# Patient Record
Sex: MALE | Race: WHITE | Employment: FULL TIME | URBAN - METROPOLITAN AREA
[De-identification: names, ages, dates, MRNs, and addresses within clinical notes are randomized per-mention and may not be internally consistent; named-entity substitution may affect disease eponyms.]

---

## 2018-10-12 RX ORDER — ATORVASTATIN CALCIUM 10 MG/1
TABLET, FILM COATED ORAL
Qty: 90 TAB | Refills: 1 | Status: SHIPPED | OUTPATIENT
Start: 2018-10-12 | End: 2019-03-31 | Stop reason: SDUPTHER

## 2019-01-24 LAB — CREATININE, EXTERNAL: 0.99

## 2019-05-09 RX ORDER — ATORVASTATIN CALCIUM 10 MG/1
TABLET, FILM COATED ORAL
Qty: 30 TAB | Refills: 0 | Status: SHIPPED | OUTPATIENT
Start: 2019-05-09 | End: 2019-12-13 | Stop reason: SDUPTHER

## 2019-08-16 ENCOUNTER — OFFICE VISIT (OUTPATIENT)
Dept: FAMILY MEDICINE CLINIC | Age: 59
End: 2019-08-16

## 2019-08-16 VITALS
TEMPERATURE: 98.4 F | SYSTOLIC BLOOD PRESSURE: 133 MMHG | WEIGHT: 214 LBS | HEIGHT: 74 IN | RESPIRATION RATE: 16 BRPM | BODY MASS INDEX: 27.46 KG/M2 | OXYGEN SATURATION: 97 % | DIASTOLIC BLOOD PRESSURE: 89 MMHG | HEART RATE: 63 BPM

## 2019-08-16 DIAGNOSIS — Z12.5 SCREENING FOR PROSTATE CANCER: ICD-10-CM

## 2019-08-16 DIAGNOSIS — Z11.59 ENCOUNTER FOR HEPATITIS C SCREENING TEST FOR LOW RISK PATIENT: ICD-10-CM

## 2019-08-16 DIAGNOSIS — E78.00 HYPERCHOLESTEREMIA: ICD-10-CM

## 2019-08-16 DIAGNOSIS — Z00.00 ANNUAL PHYSICAL EXAM: ICD-10-CM

## 2019-08-16 DIAGNOSIS — E74.39 GLUCOSE INTOLERANCE: ICD-10-CM

## 2019-08-16 DIAGNOSIS — I10 ESSENTIAL HYPERTENSION: ICD-10-CM

## 2019-08-16 RX ORDER — RAMIPRIL 2.5 MG/1
2.5 CAPSULE ORAL DAILY
Qty: 90 CAP | Refills: 1 | Status: SHIPPED | OUTPATIENT
Start: 2019-08-16 | End: 2020-03-12

## 2019-08-16 RX ORDER — ATORVASTATIN CALCIUM 10 MG/1
TABLET, FILM COATED ORAL
COMMUNITY
Start: 2019-05-28 | End: 2019-08-16 | Stop reason: SDUPTHER

## 2019-08-16 RX ORDER — METOPROLOL TARTRATE 50 MG/1
100 TABLET ORAL DAILY
Qty: 180 TAB | Refills: 1 | Status: SHIPPED | OUTPATIENT
Start: 2019-08-16 | End: 2020-03-12

## 2019-08-16 RX ORDER — RAMIPRIL 2.5 MG/1
CAPSULE ORAL
COMMUNITY
Start: 2019-07-30 | End: 2019-08-16 | Stop reason: SDUPTHER

## 2019-08-16 RX ORDER — GUAIFENESIN 100 MG/5ML
81 LIQUID (ML) ORAL DAILY
COMMUNITY

## 2019-08-16 RX ORDER — METOPROLOL TARTRATE 50 MG/1
TABLET ORAL
COMMUNITY
Start: 2019-07-30 | End: 2019-08-16 | Stop reason: SDUPTHER

## 2019-08-16 RX ORDER — ATORVASTATIN CALCIUM 10 MG/1
10 TABLET, FILM COATED ORAL DAILY
Qty: 90 TAB | Refills: 1 | Status: SHIPPED | OUTPATIENT
Start: 2019-08-16 | End: 2019-12-12 | Stop reason: SDUPTHER

## 2019-08-16 NOTE — PATIENT INSTRUCTIONS
INGUINAL HERNIA BOTH SIDES         DASH Diet: Care Instructions  Your Care Instructions    The DASH diet is an eating plan that can help lower your blood pressure. DASH stands for Dietary Approaches to Stop Hypertension. Hypertension is high blood pressure. The DASH diet focuses on eating foods that are high in calcium, potassium, and magnesium. These nutrients can lower blood pressure. The foods that are highest in these nutrients are fruits, vegetables, low-fat dairy products, nuts, seeds, and legumes. But taking calcium, potassium, and magnesium supplements instead of eating foods that are high in those nutrients does not have the same effect. The DASH diet also includes whole grains, fish, and poultry. The DASH diet is one of several lifestyle changes your doctor may recommend to lower your high blood pressure. Your doctor may also want you to decrease the amount of sodium in your diet. Lowering sodium while following the DASH diet can lower blood pressure even further than just the DASH diet alone. Follow-up care is a key part of your treatment and safety. Be sure to make and go to all appointments, and call your doctor if you are having problems. It's also a good idea to know your test results and keep a list of the medicines you take. How can you care for yourself at home? Following the DASH diet  · Eat 4 to 5 servings of fruit each day. A serving is 1 medium-sized piece of fruit, ½ cup chopped or canned fruit, 1/4 cup dried fruit, or 4 ounces (½ cup) of fruit juice. Choose fruit more often than fruit juice. · Eat 4 to 5 servings of vegetables each day. A serving is 1 cup of lettuce or raw leafy vegetables, ½ cup of chopped or cooked vegetables, or 4 ounces (½ cup) of vegetable juice. Choose vegetables more often than vegetable juice. · Get 2 to 3 servings of low-fat and fat-free dairy each day. A serving is 8 ounces of milk, 1 cup of yogurt, or 1 ½ ounces of cheese.   · Eat 6 to 8 servings of grains each day. A serving is 1 slice of bread, 1 ounce of dry cereal, or ½ cup of cooked rice, pasta, or cooked cereal. Try to choose whole-grain products as much as possible. · Limit lean meat, poultry, and fish to 2 servings each day. A serving is 3 ounces, about the size of a deck of cards. · Eat 4 to 5 servings of nuts, seeds, and legumes (cooked dried beans, lentils, and split peas) each week. A serving is 1/3 cup of nuts, 2 tablespoons of seeds, or ½ cup of cooked beans or peas. · Limit fats and oils to 2 to 3 servings each day. A serving is 1 teaspoon of vegetable oil or 2 tablespoons of salad dressing. · Limit sweets and added sugars to 5 servings or less a week. A serving is 1 tablespoon jelly or jam, ½ cup sorbet, or 1 cup of lemonade. · Eat less than 2,300 milligrams (mg) of sodium a day. If you limit your sodium to 1,500 mg a day, you can lower your blood pressure even more. Tips for success  · Start small. Do not try to make dramatic changes to your diet all at once. You might feel that you are missing out on your favorite foods and then be more likely to not follow the plan. Make small changes, and stick with them. Once those changes become habit, add a few more changes. · Try some of the following:  ? Make it a goal to eat a fruit or vegetable at every meal and at snacks. This will make it easy to get the recommended amount of fruits and vegetables each day. ? Try yogurt topped with fruit and nuts for a snack or healthy dessert. ? Add lettuce, tomato, cucumber, and onion to sandwiches. ? Combine a ready-made pizza crust with low-fat mozzarella cheese and lots of vegetable toppings. Try using tomatoes, squash, spinach, broccoli, carrots, cauliflower, and onions. ? Have a variety of cut-up vegetables with a low-fat dip as an appetizer instead of chips and dip. ? Sprinkle sunflower seeds or chopped almonds over salads. Or try adding chopped walnuts or almonds to cooked vegetables.   ? Try some vegetarian meals using beans and peas. Add garbanzo or kidney beans to salads. Make burritos and tacos with mashed zamora beans or black beans. Where can you learn more? Go to http://ramiro-vishal.info/. Enter C509 in the search box to learn more about \"DASH Diet: Care Instructions. \"  Current as of: July 22, 2018  Content Version: 12.1  © 0212-6870 [employer]. Care instructions adapted under license by [employer] (which disclaims liability or warranty for this information). If you have questions about a medical condition or this instruction, always ask your healthcare professional. [place] [address] any warranty or liability for your use of this information. The goal blood pressure for most patients is 140/90. The whole point of treating blood pressure is to prevent strokes, heart attacks and kidney damage. Persistently elevated blood pressure damages blood vessels and can lead to catastrophic heart problems and strokes. Recommendations for Hypertension (elevated blood pressure):    · Purchase a blood pressure cuff that goes around your upper arm and check blood pressure at least 3 times per week. Write down your numbers for review. Check blood pressure in the morning and evening. Rest for 5 minutes with feet elevated and arm resting on a table (at mid-chest level) when checking blood pressure    · Exercise 30-60 minutes most days of the week, preferably with a mix of cardiovascular and strength training. Exercise can improve blood pressure, even if you do not lose weight! · Limit alcohol intake to less than 3-4 drinks per week. · Avoid tobacco products    · Avoid illegal drugs especially amphetamines  · DASH diet - includes fruits, vegetables, fiber, and less than 2000 mg sodium (salt) daily. · Try to eat a low sugar diet. Sugar worsens diabetes and activates kidney hormones that raise blood pressure.    · Avoid non-steroidal inflammatory medications (NSAIDS) such as ibuprofen, advil, motrin, aleve, and naproxyn as these may increase blood pressure if used long-term    · Avoid OTC decongestants such as pseudopherine, phenylephrine, Afrin  · Take your blood pressure medicine (and aspirin if prescribed) religiously        Skin cancer prevention    It's possible to prevent skin cancer. Current recommendations include avoiding sun in the peak hours of the day, wearing hats and long sleeves in the sun and using sun blocks regularly. Patient with sun damaged skin or previously skin cancer should get yearly skin exams. Also, there is very good evidence that Vitamin B3 in the form of Nicotinamide 500mg twice a day prevents non melanoma skin cancers and lowers the rate of those cancers 20-30% over time. Nicotinamide (which is the same as Niacinamide) is available on line and is relatively inexpensive. It is also found in many B complex vitamins. No prescription is necessary for Nicotinamide.

## 2019-08-16 NOTE — PROGRESS NOTES
Assessment and Plan:    1. Annual physical exam  Diet exercise and safety discussed. Bilateral unrepaired inguinal hernias added to problem list and discussed    2. Essential hypertension  At goal, same meds  - metoprolol tartrate (LOPRESSOR) 50 mg tablet; Take 2 Tabs by mouth daily. Dispense: 180 Tab; Refill: 1  - ramipril (ALTACE) 2.5 mg capsule; Take 1 Cap by mouth daily. Dispense: 90 Cap; Refill: 1  - METABOLIC PANEL, BASIC  - MICROALBUMIN, UR, RAND W/ MICROALB/CREAT RATIO    3. Hypercholesteremia  On statin, to continue  - atorvastatin (LIPITOR) 10 mg tablet; Take 1 Tab by mouth daily. Dispense: 90 Tab; Refill: 1  - HEPATIC FUNCTION PANEL  - LIPID PANEL  - TSH 3RD GENERATION    4. Screening for prostate cancer  Normal JOSE  - PSA, DIAGNOSTIC (PROSTATE SPECIFIC AG)    5. Glucose intolerance  recheck  - CBC WITH AUTOMATED DIFF  - HEMOGLOBIN A1C WITH EAG    6. Encounter for hepatitis C screening test for low risk patient  screening  - HEPATITIS C AB      Diagnoses and plans were discussed with the patient. After visit summary given to the patient as well. Sherine Rangel MD    [facility] Kandis WEBSTER is a 60 y.o. male who had concerns including Complete Physical.    Health maintenance:    Hypertension  Takes meds consistently  At goal    Hypercholesterolemia  On statin    Immunizations needed: Tdap or DT has had   Shingrix needs   Influenza vaccine advised to get    Cancer screening status   Colonoscopy 2013, normal   PSA today   Lung CT nonsmoker    Cardiovascular risk factors:   Smoking nonsmoker   HTN under rx   Cholesterol under rx   Diabetes glucose intolerance   Chronic kidney disease   Family History    Last eye exam yearly  Last dental exam yearly  Minimal ETOH    Meds:    Current Outpatient Medications:     aspirin 81 mg chewable tablet, Take 81 mg by mouth daily. , Disp: , Rfl:     varicella-zoster recombinant, PF, (SHINGRIX) 50 mcg/0.5 mL susr injection, 0.5 mL by IntraMUSCular route once for 1 dose. Repeat second dose in 6 months., Disp: 0.5 mL, Rfl: 1    atorvastatin (LIPITOR) 10 mg tablet, Take 1 Tab by mouth daily. , Disp: 90 Tab, Rfl: 1    metoprolol tartrate (LOPRESSOR) 50 mg tablet, Take 2 Tabs by mouth daily. , Disp: 180 Tab, Rfl: 1    ramipril (ALTACE) 2.5 mg capsule, Take 1 Cap by mouth daily. , Disp: 90 Cap, Rfl: 1   Allergies:  Not on File  Smoker:   Social History     Tobacco Use   Smoking Status Never Smoker   Smokeless Tobacco Never Used     ETOH:   Social History     Substance and Sexual Activity   Alcohol Use Yes       FH:    Family History   Problem Relation Age of Onset    Coronary Artery Disease Father     Hypertension Father     Lung Cancer Father     Heart Attack Father        ROS:  General/Constitutional:  No headache, fever, fatigue, weight loss or weight gain     Eyes:  No redness, pruritis, pain, visual changes, swelling, or discharge    Ears:  No pain, loss or changes in hearin    Neck:  No swelling, masses, stiffness, pain, or limited movemen    Cardiac:   No chest pain    Respiratory:  No cough or shortness of breath    GI:  No nausea/vomiting, diarrhea, abdominal pain, bloody or dark stools     :  No dysuria or  hematuria   Neurological:  No loss of consciousness, dizziness, seizures, dysarthria, cognitive changes, memory changes,  problems with balance, or unilateral weakness    Skin: No rash         Physical Exam:  Visit Vitals  /89 (BP 1 Location: Right arm, BP Patient Position: Sitting)   Pulse 63   Temp 98.4 °F (36.9 °C) (Oral)   Resp 16   Ht 6' 2\" (1.88 m)   Wt 214 lb (97.1 kg)   SpO2 97%   BMI 27.48 kg/m²       Physical Examination: General appearance - alert, well appearing, and in no distress, oriented to person, place, and time and normal appearing weight  Mental status -  normal mood, behavior, speech, dress, motor activity, and thought processes, no expressed suicidal or homicidal ideation, no hallucinations  Ears - bilateral TM's and external ear canals normal  Nose - normal and patent, no erythema, discharge or polyps  Mouth - mucous membranes moist, pharynx normal without lesions  Neck - supple, no significant adenopathy  Chest - normal chest excursion, normal inspiratory and expiratory function. Clear to ausculation bilaterally. Heart - normal rate, regular rhythm, normal S1, S2, no murmurs, rubs, clicks or gallops, no extremity edema  Abdomen- benign, no organomegaly or masses, bilateral inguinal hernias  -normal penis and testicles, 2+ prostate  Skin-no rashes or suspicious moles  Heme negative stool  Neuro normal speech, moves all extremities, normal gait  Musculoskeletal- grossly normal joint and motor function.

## 2019-08-16 NOTE — PROGRESS NOTES
Frankie Mansfield is a 60 y.o. male      Chief Complaint   Patient presents with    Complete Physical         1. Have you been to the ER, urgent care clinic since your last visit? Hospitalized since your last visit? Yes   Partial knee replacement       2. Have you seen or consulted any other health care providers outside of the [address] [place] [address] since your last visit? Include any pap smears or colon screening. no

## 2019-08-17 LAB
ALBUMIN SERPL-MCNC: 4.9 G/DL (ref 3.5–5.5)
ALBUMIN/CREAT UR: 2.5 MG/G CREAT (ref 0–30)
ALP SERPL-CCNC: 87 IU/L (ref 39–117)
ALT SERPL-CCNC: 21 IU/L (ref 0–44)
AST SERPL-CCNC: 20 IU/L (ref 0–40)
BASOPHILS # BLD AUTO: 0 X10E3/UL (ref 0–0.2)
BASOPHILS NFR BLD AUTO: 0 %
BILIRUB DIRECT SERPL-MCNC: 0.19 MG/DL (ref 0–0.4)
BILIRUB SERPL-MCNC: 0.6 MG/DL (ref 0–1.2)
BUN SERPL-MCNC: 15 MG/DL (ref 6–24)
BUN/CREAT SERPL: 14 (ref 9–20)
CALCIUM SERPL-MCNC: 10.4 MG/DL (ref 8.7–10.2)
CHLORIDE SERPL-SCNC: 102 MMOL/L (ref 96–106)
CHOLEST SERPL-MCNC: 185 MG/DL (ref 100–199)
CO2 SERPL-SCNC: 27 MMOL/L (ref 20–29)
CREAT SERPL-MCNC: 1.11 MG/DL (ref 0.76–1.27)
CREAT UR-MCNC: 157.6 MG/DL
EOSINOPHIL # BLD AUTO: 0.3 X10E3/UL (ref 0–0.4)
EOSINOPHIL NFR BLD AUTO: 5 %
ERYTHROCYTE [DISTWIDTH] IN BLOOD BY AUTOMATED COUNT: 14.1 % (ref 12.3–15.4)
EST. AVERAGE GLUCOSE BLD GHB EST-MCNC: 123 MG/DL
GLUCOSE SERPL-MCNC: 87 MG/DL (ref 65–99)
HBA1C MFR BLD: 5.9 % (ref 4.8–5.6)
HCT VFR BLD AUTO: 46.6 % (ref 37.5–51)
HCV AB S/CO SERPL IA: <0.1 S/CO RATIO (ref 0–0.9)
HDLC SERPL-MCNC: 71 MG/DL
HGB BLD-MCNC: 16 G/DL (ref 13–17.7)
IMM GRANULOCYTES # BLD AUTO: 0 X10E3/UL (ref 0–0.1)
IMM GRANULOCYTES NFR BLD AUTO: 0 %
LDLC SERPL CALC-MCNC: 98 MG/DL (ref 0–99)
LYMPHOCYTES # BLD AUTO: 2.3 X10E3/UL (ref 0.7–3.1)
LYMPHOCYTES NFR BLD AUTO: 34 %
MCH RBC QN AUTO: 31 PG (ref 26.6–33)
MCHC RBC AUTO-ENTMCNC: 34.3 G/DL (ref 31.5–35.7)
MCV RBC AUTO: 90 FL (ref 79–97)
MICROALBUMIN UR-MCNC: 3.9 UG/ML
MONOCYTES # BLD AUTO: 0.7 X10E3/UL (ref 0.1–0.9)
MONOCYTES NFR BLD AUTO: 10 %
NEUTROPHILS # BLD AUTO: 3.4 X10E3/UL (ref 1.4–7)
NEUTROPHILS NFR BLD AUTO: 51 %
PLATELET # BLD AUTO: 278 X10E3/UL (ref 150–450)
POTASSIUM SERPL-SCNC: 5 MMOL/L (ref 3.5–5.2)
PROT SERPL-MCNC: 7.4 G/DL (ref 6–8.5)
PSA SERPL-MCNC: 1.1 NG/ML (ref 0–4)
RBC # BLD AUTO: 5.16 X10E6/UL (ref 4.14–5.8)
SODIUM SERPL-SCNC: 143 MMOL/L (ref 134–144)
TRIGL SERPL-MCNC: 78 MG/DL (ref 0–149)
TSH SERPL DL<=0.005 MIU/L-ACNC: 1.12 UIU/ML (ref 0.45–4.5)
VLDLC SERPL CALC-MCNC: 16 MG/DL (ref 5–40)
WBC # BLD AUTO: 6.7 X10E3/UL (ref 3.4–10.8)

## 2019-08-19 NOTE — PROGRESS NOTES
Your blood work looks good. The blood sugar and cholesterol are in good ranges. The only real abnormality is a minimally elevated calcium level. The calcium test is a inconsistent sometimes erroneous test and for this mild abnormality, I would only recommend rechecking the level in 6 months at your next appointment. I don't think it is reflective of any real problem. See me in 6 months.

## 2019-08-20 ENCOUNTER — TELEPHONE (OUTPATIENT)
Dept: FAMILY MEDICINE CLINIC | Age: 59
End: 2019-08-20

## 2019-08-20 NOTE — TELEPHONE ENCOUNTER
Call him. I can probably fill it out unless they have some unusual requirements. Send to us and I'll look it over and decide.   [facility]

## 2019-08-20 NOTE — TELEPHONE ENCOUNTER
Patient was here on Friday. He is starting Scuba lessons as a new hobby and they want his PCP to review a form he filled out before he can start lessons. Patient is faxing it to us, is this ok to fill out as he was just here or would you like him to have another appt?

## 2019-12-12 DIAGNOSIS — E78.00 HYPERCHOLESTEREMIA: ICD-10-CM

## 2019-12-13 RX ORDER — ATORVASTATIN CALCIUM 10 MG/1
TABLET, FILM COATED ORAL
Qty: 90 TAB | Refills: 1 | Status: SHIPPED | OUTPATIENT
Start: 2019-12-13 | End: 2020-06-01

## 2020-03-11 DIAGNOSIS — I10 ESSENTIAL HYPERTENSION: ICD-10-CM

## 2020-03-12 RX ORDER — RAMIPRIL 2.5 MG/1
CAPSULE ORAL
Qty: 90 CAP | Refills: 0 | Status: SHIPPED | OUTPATIENT
Start: 2020-03-12 | End: 2020-06-05

## 2020-03-12 RX ORDER — METOPROLOL TARTRATE 50 MG/1
TABLET ORAL
Qty: 180 TAB | Refills: 0 | Status: SHIPPED | OUTPATIENT
Start: 2020-03-12 | End: 2020-06-05

## 2020-05-06 ENCOUNTER — VIRTUAL VISIT (OUTPATIENT)
Dept: FAMILY MEDICINE CLINIC | Age: 60
End: 2020-05-06

## 2020-05-06 DIAGNOSIS — J40 BRONCHITIS: Primary | ICD-10-CM

## 2020-05-06 RX ORDER — ALBUTEROL SULFATE 90 UG/1
1 AEROSOL, METERED RESPIRATORY (INHALATION)
Qty: 1 INHALER | Refills: 0 | Status: SHIPPED | OUTPATIENT
Start: 2020-05-06 | End: 2021-05-27

## 2020-05-06 RX ORDER — DOXYCYCLINE 100 MG/1
100 CAPSULE ORAL 2 TIMES DAILY
Qty: 20 CAP | Refills: 0 | Status: SHIPPED | OUTPATIENT
Start: 2020-05-06 | End: 2021-05-27

## 2020-05-06 NOTE — PROGRESS NOTES
Scott Wood III is a 60 y.o. male evaluated via telephone on 5/6/2020. Consent:  He and/or health care decision maker is aware that he may receive a bill for this telephone service, depending on his insurance coverage, and has provided verbal consent to proceed: Yes      Documentation:  I communicated with the patient and/or health care decision maker about cough. Details of this discussion including any medical advice provided:     Cough since cold sx in January  Was at his mother's in [place] and spent time cleaning house where she has a pet bird. Wife thinks this is involved somehow. No fever, productive of phlegm. No dyspnea. Maybe wheezing some  Seen at urgent care and initially given Cefdinir and Prednisone. FU visit no better. Virtual visit, given levaquin. Still no better  No history of asthma or COPD  Non smoker  No chest pain. ON ACE but no problems with that med over 15 years. Assessment and Plan:    1. Bronchitis  Trial of meds below to cover psittacosis which is probably still unlikely. - doxycycline (VIBRAMYCIN) 100 mg capsule; Take 1 Cap by mouth two (2) times a day. Dispense: 20 Cap; Refill: 0  - albuterol (PROVENTIL HFA, VENTOLIN HFA, PROAIR HFA) 90 mcg/actuation inhaler; Take 1 Puff by inhalation every six (6) hours as needed for Wheezing. Dispense: 1 Inhaler; Refill: 0  If not better in 10 days, needs to be seen in resp clinic for:  CXR, COVID test, in person eval and whatever additional tests such as PFT's that are appropriate. Follow-up and Dispositions    · Return in about 10 days (around 5/16/2020) for Medication follow up. I affirm this is a Patient Initiated Episode with a Patient who has not had a related appointment within my department in the past 7 days or scheduled within the next 24 hours.     Total Time: minutes: 11-20 minutes    Note: not billable if this call serves to triage the patient into an appointment for the relevant concern    The patient was at home and I was in office for this phone visit.     Samia Mcfarland MD

## 2020-05-06 NOTE — PROGRESS NOTES
Patient stated name &     Chief Complaint   Patient presents with    Cough     On going since January     Last Friday went to [facility] 2x - March & this week     Antibotic prescribed Cefdinr & Prednisone, Zyrtec-otc all in March    This past week Levofloxin, otc-Allegra        Health Maintenance Due   Topic    DTaP/Tdap/Td series (1 - Tdap)    Shingrix Vaccine Age 50> (1 of 2)    FOBT Q1Y Age 54-65        Wt Readings from Last 3 Encounters:   19 214 lb (97.1 kg)   06/26/15 210 lb (95.3 kg)     Temp Readings from Last 3 Encounters:   19 98.4 °F (36.9 °C) (Oral)   06/26/15 99.1 °F (37.3 °C)     BP Readings from Last 3 Encounters:   19 133/89   06/26/15 134/74     Pulse Readings from Last 3 Encounters:   19 63   06/26/15 88         Learning Assessment:  :     No flowsheet data found. Depression Screening:  :     3 most recent PHQ Screens 2019   Little interest or pleasure in doing things Not at all   Feeling down, depressed, irritable, or hopeless Not at all   Total Score PHQ 2 0       Fall Risk Assessment:  :     No flowsheet data found. Abuse Screening:  :     No flowsheet data found. Coordination of Care Questionnaire:  :     1) Have you been to an emergency room, urgent care clinic since your last visit? Yes  Am Family [facility] Cough   Hospitalized since your last visit? No             2) Have you seen or consulted any other health care providers outside of [address] [place] [address] since your last visit? No  (Include any pap smears or colon screenings in this section.)    Patient is accompanied by virtual visit I have received verbal consent from Amanda Pettit III to discuss any/all medical information while they are present in the room.

## 2020-05-13 ENCOUNTER — PATIENT MESSAGE (OUTPATIENT)
Dept: FAMILY MEDICINE CLINIC | Age: 60
End: 2020-05-13

## 2020-05-13 NOTE — TELEPHONE ENCOUNTER
Called re advice message    Wife is + for COVID antibody. Does that change his rx. Still has some drainage and cough but no fever or dyspnea  Hard to know if wife is a true positive or false positive. Stick with current rx and FU next week as scheduled. Urgent eval if dyspnea or fever.   [facility]

## 2020-05-18 ENCOUNTER — VIRTUAL VISIT (OUTPATIENT)
Dept: FAMILY MEDICINE CLINIC | Age: 60
End: 2020-05-18

## 2020-05-18 VITALS — WEIGHT: 214 LBS | HEIGHT: 74 IN | BODY MASS INDEX: 27.46 KG/M2

## 2020-05-18 DIAGNOSIS — R09.82 POST-NASAL DRIP: ICD-10-CM

## 2020-05-18 DIAGNOSIS — J40 BRONCHITIS: Primary | ICD-10-CM

## 2020-05-18 NOTE — PROGRESS NOTES
Milton Espinoza III is a 60 y.o. male      Chief Complaint   Patient presents with    Cough     This is a follow-up  Bronchitis         1. Have you been to the ER, urgent care clinic since your last visit? Hospitalized since your last visit? Urgent Care - went in for MARIA FERNANDA antibody test (neg )      2. Have you seen or consulted any other health care providers outside of the [address] [place] [address] since your last visit? Include any pap smears or colon screening.   no

## 2020-05-18 NOTE — PROGRESS NOTES
Margo Ovalle III is a 60 y.o. male who was seen by synchronous (real-time) audio-video technology on 5/18/2020. Consent: Margo Ovalle III, who was seen by synchronous (real-time) audio-video technology, and/or his healthcare decision maker, is aware that this patient-initiated, Telehealth encounter on 5/18/2020 is a billable service, with coverage as determined by his insurance carrier. He is aware that he may receive a bill and has provided verbal consent to proceed: Yes. Assessment & Plan:     1. Bronchitis  Resolving cough and respiratory sx. Feels he is improving  COVID negative  Wife COVID AB + (unclear if false positive, not ill)    2. Post-nasal drip  Continued mild sx that could be:  Mild allergies, post infectious. Advised trial of allegra which he already has  May consider environmental dust mite measures such as pillow covers and mattress covers, filters for [facility] and heating system    FU if sx continue and if so  For cough, consider CXR and changing ACE  For nasal discharge, montelukast.        I spent at least 23 minutes on this visit with this established patient. ([id number])    Subjective:   Margo Ovalle III is a 60 y.o. male who was seen for Cough (This is a follow-up  Bronchitis)    Finisihed doxycycline  Feels a lot better. Minimal cough   Still some post nasal drip  Taking some allegra D  No fever or body aches  COVID test he had done was negative  (wife AB positive but not sick)  Has a pet dog, gas forced air. House is not damp  He thinks he reacts to air fresheners. Prior to Admission medications    Medication Sig Start Date End Date Taking? Authorizing Provider   albuterol (PROVENTIL HFA, VENTOLIN HFA, PROAIR HFA) 90 mcg/actuation inhaler Take 1 Puff by inhalation every six (6) hours as needed for Wheezing.  5/6/20  Yes Bethany Nichols MD   metoprolol tartrate (LOPRESSOR) 50 mg tablet TAKE 2 TABLETS BY MOUTH  DAILY 3/12/20  Yes Bethany Nichols MD ramipriL (ALTACE) 2.5 mg capsule TAKE 1 CAPSULE BY MOUTH  DAILY 3/12/20  Yes Paras Calderon MD   atorvastatin (LIPITOR) 10 mg tablet TAKE 1 TABLET BY MOUTH  DAILY 12/13/19  Yes Paras Calderon MD   aspirin 81 mg chewable tablet Take 81 mg by mouth daily. Yes Provider, Historical   doxycycline (VIBRAMYCIN) 100 mg capsule Take 1 Cap by mouth two (2) times a day. 5/6/20   Paras Calderon MD     No Known Allergies        Review of Systems   Constitutional: Negative for chills and fever. HENT: Positive for congestion. Negative for sinus pain and sore throat. Respiratory: Positive for cough (improving). Negative for sputum production, shortness of breath and wheezing. Cardiovascular: Negative for chest pain. Psychiatric/Behavioral: Negative.         Objective:   Vital Signs: (As obtained by patient/caregiver at home)  Visit Vitals  Ht 6' 2\" (1.88 m)   Wt 214 lb (97.1 kg)   BMI 27.48 kg/m²        [INSTRUCTIONS:  \"[x]\" Indicates a positive item  \"[]\" Indicates a negative item  -- DELETE ALL ITEMS NOT EXAMINED]    Constitutional: [x] Appears well-developed and well-nourished [x] No apparent distress      [] Abnormal -     Mental status: [x] Alert and awake  [x] Oriented to person/place/time [x] Able to follow commands    [] Abnormal -     Eyes:   EOM    [x]  Normal    [] Abnormal -   Sclera  [x]  Normal    [] Abnormal -          Discharge [x]  None visible   [] Abnormal -     HENT: [x] Normocephalic, atraumatic  [] Abnormal -   [x] Mouth/Throat: Mucous membranes are moist    External Ears [x] Normal  [] Abnormal -    Neck: [x] No visualized mass [] Abnormal -     Pulmonary/Chest: [x] Respiratory effort normal   [x] No visualized signs of difficulty breathing or respiratory distress        [] Abnormal -      Musculoskeletal:   [x] Normal gait with no signs of ataxia         [x] Normal range of motion of neck        [] Abnormal -     Neurological:        [x] No Facial Asymmetry (Cranial nerve 7 motor function) (limited exam due to video visit)          [x] No gaze palsy        [] Abnormal -          Skin:        [x] No significant exanthematous lesions or discoloration noted on facial skin         [] Abnormal -            Psychiatric:       [x] Normal Affect [] Abnormal -        [x] No Hallucinations    Other pertinent observable physical exam findings:-        We discussed the expected course, resolution and complications of the diagnosis(es) in detail. Medication risks, benefits, costs, interactions, and alternatives were discussed as indicated. I advised him to contact the office if his condition worsens, changes or fails to improve as anticipated. He expressed understanding with the diagnosis(es) and plan. Tyler Jackson III is a 60 y.o. male who was evaluated by a video visit encounter for concerns as above. Patient identification was verified prior to start of the visit. A caregiver was present when appropriate. Due to this being a TeleHealth encounter (During NEIUI-59 public health emergency), evaluation of the following organ systems was limited: Vitals/Constitutional/EENT/Resp/CV/GI//MS/Neuro/Skin/Heme-Lymph-Imm. Pursuant to the emergency declaration under the [facility], [facility] waiver authority and the [employer] and [employer] Act, this Virtual  Visit was conducted, with patient's (and/or legal guardian's) consent, to reduce the patient's risk of exposure to COVID-19 and provide necessary medical care. Services were provided through a video synchronous discussion virtually to substitute for in-person clinic visit. Patient was at home and I was in office for this video visit. Morena Taylor.  Liss Castaneda MD

## 2020-05-30 DIAGNOSIS — E78.00 HYPERCHOLESTEREMIA: ICD-10-CM

## 2020-06-01 RX ORDER — ATORVASTATIN CALCIUM 10 MG/1
TABLET, FILM COATED ORAL
Qty: 90 TAB | Refills: 1 | Status: SHIPPED | OUTPATIENT
Start: 2020-06-01 | End: 2020-11-16

## 2021-03-01 ENCOUNTER — IMMUNIZATION (OUTPATIENT)
Dept: INTERNAL MEDICINE CLINIC | Age: 61
End: 2021-03-01
Payer: COMMERCIAL

## 2021-03-01 DIAGNOSIS — Z23 ENCOUNTER FOR IMMUNIZATION: Primary | ICD-10-CM

## 2021-03-01 PROCEDURE — 91300 COVID-19, MRNA, LNP-S, PF, 30MCG/0.3ML DOSE(PFIZER): CPT | Performed by: FAMILY MEDICINE

## 2021-03-01 PROCEDURE — 0001A COVID-19, MRNA, LNP-S, PF, 30MCG/0.3ML DOSE(PFIZER): CPT | Performed by: FAMILY MEDICINE

## 2021-03-15 ENCOUNTER — TELEPHONE (OUTPATIENT)
Dept: FAMILY MEDICINE CLINIC | Age: 61
End: 2021-03-15

## 2021-03-15 DIAGNOSIS — I10 ESSENTIAL HYPERTENSION: ICD-10-CM

## 2021-03-15 RX ORDER — RAMIPRIL 2.5 MG/1
CAPSULE ORAL
Qty: 30 CAP | Refills: 0 | Status: SHIPPED | OUTPATIENT
Start: 2021-03-15 | End: 2021-05-27 | Stop reason: SDUPTHER

## 2021-03-15 RX ORDER — METOPROLOL TARTRATE 50 MG/1
TABLET ORAL
Qty: 60 TAB | Refills: 0 | Status: SHIPPED | OUTPATIENT
Start: 2021-03-15 | End: 2021-05-27 | Stop reason: SDUPTHER
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
## 2021-03-22 ENCOUNTER — IMMUNIZATION (OUTPATIENT)
Dept: INTERNAL MEDICINE CLINIC | Age: 61
End: 2021-03-22
Payer: COMMERCIAL

## 2021-03-22 DIAGNOSIS — Z23 ENCOUNTER FOR IMMUNIZATION: Primary | ICD-10-CM

## 2021-03-22 PROCEDURE — 91300 COVID-19, MRNA, LNP-S, PF, 30MCG/0.3ML DOSE(PFIZER): CPT | Performed by: FAMILY MEDICINE

## 2021-03-22 PROCEDURE — 0002A COVID-19, MRNA, LNP-S, PF, 30MCG/0.3ML DOSE(PFIZER): CPT | Performed by: FAMILY MEDICINE

## 2021-04-06 ENCOUNTER — PATIENT MESSAGE (OUTPATIENT)
Dept: FAMILY MEDICINE CLINIC | Age: 61
End: 2021-04-06

## 2021-04-06 NOTE — TELEPHONE ENCOUNTER
[employer] message sent on behalf of [employer] chart review for overdue or Hemoglobin A1C's over 9%.

## 2021-05-27 ENCOUNTER — VIRTUAL VISIT (OUTPATIENT)
Dept: FAMILY MEDICINE CLINIC | Age: 61
End: 2021-05-27
Payer: COMMERCIAL

## 2021-05-27 DIAGNOSIS — E78.00 HYPERCHOLESTEREMIA: ICD-10-CM

## 2021-05-27 DIAGNOSIS — Z23 ENCOUNTER FOR IMMUNIZATION: ICD-10-CM

## 2021-05-27 DIAGNOSIS — J30.9 ALLERGIC RHINITIS, UNSPECIFIED SEASONALITY, UNSPECIFIED TRIGGER: ICD-10-CM

## 2021-05-27 DIAGNOSIS — I10 ESSENTIAL HYPERTENSION: Primary | ICD-10-CM

## 2021-05-27 PROCEDURE — 99213 OFFICE O/P EST LOW 20 MIN: CPT | Performed by: FAMILY MEDICINE

## 2021-05-27 RX ORDER — ATORVASTATIN CALCIUM 10 MG/1
TABLET, FILM COATED ORAL
Qty: 90 TABLET | Refills: 1 | Status: SHIPPED | OUTPATIENT
Start: 2021-05-27 | End: 2021-12-24

## 2021-05-27 RX ORDER — ZOSTER VACCINE RECOMBINANT, ADJUVANTED 50 MCG/0.5
0.5 KIT INTRAMUSCULAR ONCE
Qty: 0.5 ML | Refills: 1 | Status: SHIPPED | OUTPATIENT
Start: 2021-05-27 | End: 2021-05-27 | Stop reason: SDUPTHER

## 2021-05-27 RX ORDER — RAMIPRIL 2.5 MG/1
CAPSULE ORAL
Qty: 90 CAPSULE | Refills: 1 | Status: SHIPPED | OUTPATIENT
Start: 2021-05-27 | End: 2021-10-07

## 2021-05-27 RX ORDER — MONTELUKAST SODIUM 10 MG/1
10 TABLET ORAL DAILY
Qty: 90 TABLET | Refills: 1 | Status: SHIPPED | OUTPATIENT
Start: 2021-05-27 | End: 2022-02-13

## 2021-05-27 RX ORDER — METOPROLOL TARTRATE 50 MG/1
TABLET ORAL
Qty: 180 TABLET | Refills: 1 | Status: SHIPPED | OUTPATIENT
Start: 2021-05-27 | End: 2021-10-07

## 2021-05-27 RX ORDER — ZOSTER VACCINE RECOMBINANT, ADJUVANTED 50 MCG/0.5
0.5 KIT INTRAMUSCULAR ONCE
Qty: 0.5 ML | Refills: 1 | Status: SHIPPED | OUTPATIENT
Start: 2021-05-27 | End: 2021-05-27

## 2021-05-27 NOTE — PROGRESS NOTES
Identified pt with two pt identifiers(name and ). Reviewed record in preparation for visit and have obtained necessary documentation. Chief Complaint   Patient presents with    Medication Refill        Health Maintenance Due   Topic    DTaP/Tdap/Td series (1 - Tdap)    Shingrix Vaccine Age 50> (1 of 2)    Colorectal Cancer Screening Combo        There were no vitals taken for this visit. Coordination of Care Questionnaire:  :   1) Have you been to an emergency room, urgent care, or hospitalized since your last visit? If yes, where when, and reason for visit? NO      2. Have seen or consulted any other health care provider since your last visit? If yes, where when, and reason for visit? NO      Patient is accompanied by SELF  I have received verbal consent from Leonor Knox III to discuss any/all medical information while they are present in the room.

## 2021-05-27 NOTE — PROGRESS NOTES
Megha Kennedy III is a 63 y.o. male who was seen by synchronous (real-time) audio-video technology on 5/27/2021. Consent: Megha Kennedy III, who was seen by synchronous (real-time) audio-video technology, and/or his healthcare decision maker, is aware that this patient-initiated, Telehealth encounter on 5/27/2021 is a billable service, with coverage as determined by his insurance carrier. He is aware that he may receive a bill and has provided verbal consent to proceed: Yes. Assessment & Plan:   Diagnoses and all orders for this visit:    1. Essential hypertension  -     metoprolol tartrate (LOPRESSOR) 50 mg tablet; TAKE 2 TABLETS BY MOUTH  DAILY  -     ramipriL (ALTACE) 2.5 mg capsule; TAKE 1 CAPSULE BY MOUTH  DAILY    2. Hypercholesteremia  -     atorvastatin (LIPITOR) 10 mg tablet; TAKE 1 TABLET BY MOUTH  DAILY    3. Allergic rhinitis, unspecified seasonality, unspecified trigger  -     montelukast (SINGULAIR) 10 mg tablet; Take 1 Tablet by mouth daily. 4. Encounter for immunization  -     varicella-zoster recombinant, PF, (Shingrix, PF,) 50 mcg/0.5 mL susr injection; 0.5 mL by IntraMUSCular route once for 1 dose. Repeat dose in 6 months. Follow-up and Dispositions    · Return in about 6 months (around 11/27/2021) for Medication follow up, Annual exam.         I spent at least 23 minutes on this visit with this established patient. ([id number])    Subjective:   Megha Kennedy III is a 63 y.o. male who was seen for Medication Refill    Hypertension  /80 at home  No other sx  Had insurance physic    Hypercholesterolemia  On statin    Post nasal drip and allergies. Not better with antihistamines and environmental measures        Prior to Admission medications    Medication Sig Start Date End Date Taking?  Authorizing Provider   atorvastatin (LIPITOR) 10 mg tablet TAKE 1 TABLET BY MOUTH  DAILY 5/27/21  Yes Cristóbal Corea MD   metoprolol tartrate (LOPRESSOR) 50 mg tablet TAKE 2 TABLETS BY MOUTH  DAILY 5/27/21  Yes Adryan Camacho MD   ramipriL (ALTACE) 2.5 mg capsule TAKE 1 CAPSULE BY MOUTH  DAILY 5/27/21  Yes Adryan Camacho MD   montelukast (SINGULAIR) 10 mg tablet Take 1 Tablet by mouth daily. 5/27/21  Yes Adryan Camacho MD   varicella-zoster recombinant, PF, (Shingrix, PF,) 50 mcg/0.5 mL susr injection 0.5 mL by IntraMUSCular route once for 1 dose. Repeat dose in 6 months. 5/27/21 5/27/21 Yes Adryan Camacho MD   aspirin 81 mg chewable tablet Take 81 mg by mouth daily. Yes Provider, Historical   varicella-zoster recombinant, PF, (Shingrix, PF,) 50 mcg/0.5 mL susr injection 0.5 mL by IntraMUSCular route once for 1 dose. Repeat dose in 6 months. 5/27/21 5/27/21  Adryan Camacho MD   metoprolol tartrate (LOPRESSOR) 50 mg tablet TAKE 2 TABLETS BY MOUTH  DAILY 3/15/21 5/27/21  Adryan Camacho MD   ramipriL (ALTACE) 2.5 mg capsule TAKE 1 CAPSULE BY MOUTH  DAILY 3/15/21 5/27/21  Adryan Camacho MD   atorvastatin (LIPITOR) 10 mg tablet TAKE 1 TABLET BY MOUTH  DAILY 11/16/20 5/27/21  Adryan Camacho MD   doxycycline (VIBRAMYCIN) 100 mg capsule Take 1 Cap by mouth two (2) times a day. Patient not taking: Reported on 5/27/2021 5/6/20 5/27/21  Adryan Camacho MD   albuterol (PROVENTIL HFA, VENTOLIN HFA, PROAIR HFA) 90 mcg/actuation inhaler Take 1 Puff by inhalation every six (6) hours as needed for Wheezing. Patient not taking: Reported on 5/27/2021 5/6/20 5/27/21  Adryan Camacho MD     No Known Allergies        Review of Systems   HENT: Positive for congestion. Respiratory: Positive for cough. Negative for shortness of breath. Cardiovascular: Negative for chest pain. Gastrointestinal: Negative for blood in stool. Objective:   Vital Signs: (As obtained by patient/caregiver at home)  There were no vitals taken for this visit.      [INSTRUCTIONS:  \"[x]\" Indicates a positive item  \"[]\" Indicates a negative item  -- DELETE ALL ITEMS NOT EXAMINED]    Constitutional: [x] Appears well-developed and well-nourished [x] No apparent distress      [] Abnormal -     Mental status: [x] Alert and awake  [x] Oriented to person/place/time [x] Able to follow commands    [] Abnormal -     Eyes:   EOM    [x]  Normal    [] Abnormal -   Sclera  [x]  Normal    [] Abnormal -          Discharge [x]  None visible   [] Abnormal -     HENT: [x] Normocephalic, atraumatic  [] Abnormal -   [x] Mouth/Throat: Mucous membranes are moist    External Ears [x] Normal  [] Abnormal -    Neck: [x] No visualized mass [] Abnormal -     Pulmonary/Chest: [x] Respiratory effort normal   [x] No visualized signs of difficulty breathing or respiratory distress        [] Abnormal -      Musculoskeletal:   [x] Normal gait with no signs of ataxia         [x] Normal range of motion of neck        [] Abnormal -     Neurological:        [x] No Facial Asymmetry (Cranial nerve 7 motor function) (limited exam due to video visit)          [x] No gaze palsy        [] Abnormal -          Skin:        [x] No significant exanthematous lesions or discoloration noted on facial skin         [] Abnormal -            Psychiatric:       [x] Normal Affect [] Abnormal -        [x] No Hallucinations    Other pertinent observable physical exam findings:-        We discussed the expected course, resolution and complications of the diagnosis(es) in detail. Medication risks, benefits, costs, interactions, and alternatives were discussed as indicated. I advised him to contact the office if his condition worsens, changes or fails to improve as anticipated. He expressed understanding with the diagnosis(es) and plan. Letitia Baldwin III is a 63 y.o. male who was evaluated by a video visit encounter for concerns as above. Patient identification was verified prior to start of the visit. A caregiver was present when appropriate.  Due to this being a TeleHealth encounter (During [facility]- public health emergency), evaluation of the following organ systems was limited: Vitals/Constitutional/EENT/Resp/CV/GI//MS/Neuro/Skin/Heme-Lymph-Imm. Pursuant to the emergency declaration under the [address] [place] [address], [facility] waiver authority and the [employer] Act, this Virtual  Visit was conducted, with patient's (and/or legal guardian's) consent, to reduce the patient's risk of exposure to COVID-19 and provide necessary medical care. Services were provided through a video synchronous discussion virtually to substitute for in-person clinic visit. Patient was at home and I was in office for this video visit. Celia El.  Naomi Ramires MD

## 2021-09-28 ENCOUNTER — PATIENT MESSAGE (OUTPATIENT)
Dept: FAMILY MEDICINE CLINIC | Age: 61
End: 2021-09-28

## 2021-09-28 DIAGNOSIS — T75.3XXA MOTION SICKNESS, INITIAL ENCOUNTER: Primary | ICD-10-CM

## 2021-09-29 RX ORDER — SCOLOPAMINE TRANSDERMAL SYSTEM 1 MG/1
1 PATCH, EXTENDED RELEASE TRANSDERMAL
Qty: 1 PATCH | Refills: 2 | Status: SHIPPED | OUTPATIENT
Start: 2021-09-29 | End: 2022-04-13 | Stop reason: SDUPTHER

## 2022-02-13 DIAGNOSIS — J30.9 ALLERGIC RHINITIS, UNSPECIFIED SEASONALITY, UNSPECIFIED TRIGGER: ICD-10-CM

## 2022-02-13 RX ORDER — MONTELUKAST SODIUM 10 MG/1
10 TABLET ORAL DAILY
Qty: 90 TABLET | Refills: 3 | Status: SHIPPED | OUTPATIENT
Start: 2022-02-13

## 2022-04-13 ENCOUNTER — OFFICE VISIT (OUTPATIENT)
Dept: FAMILY MEDICINE CLINIC | Age: 62
End: 2022-04-13
Payer: COMMERCIAL

## 2022-04-13 VITALS
OXYGEN SATURATION: 97 % | TEMPERATURE: 97.4 F | BODY MASS INDEX: 28.83 KG/M2 | WEIGHT: 224.6 LBS | RESPIRATION RATE: 18 BRPM | HEIGHT: 74 IN | SYSTOLIC BLOOD PRESSURE: 138 MMHG | DIASTOLIC BLOOD PRESSURE: 87 MMHG | HEART RATE: 62 BPM

## 2022-04-13 DIAGNOSIS — T75.3XXA MOTION SICKNESS, INITIAL ENCOUNTER: ICD-10-CM

## 2022-04-13 DIAGNOSIS — M25.511 ACUTE PAIN OF RIGHT SHOULDER: ICD-10-CM

## 2022-04-13 DIAGNOSIS — I10 ESSENTIAL HYPERTENSION: Primary | ICD-10-CM

## 2022-04-13 DIAGNOSIS — J30.9 ALLERGIC RHINITIS, UNSPECIFIED SEASONALITY, UNSPECIFIED TRIGGER: ICD-10-CM

## 2022-04-13 DIAGNOSIS — E78.00 HYPERCHOLESTEREMIA: ICD-10-CM

## 2022-04-13 LAB
ALBUMIN SERPL-MCNC: 4.5 G/DL (ref 3.5–5)
ALBUMIN/GLOB SERPL: 1.5 {RATIO} (ref 1.1–2.2)
ALP SERPL-CCNC: 100 U/L (ref 45–117)
ALT SERPL-CCNC: 41 U/L (ref 12–78)
ANION GAP SERPL CALC-SCNC: 4 MMOL/L (ref 5–15)
AST SERPL-CCNC: 21 U/L (ref 15–37)
BASOPHILS # BLD: 0.1 K/UL (ref 0–0.1)
BASOPHILS NFR BLD: 1 % (ref 0–1)
BILIRUB DIRECT SERPL-MCNC: 0.3 MG/DL (ref 0–0.2)
BILIRUB SERPL-MCNC: 0.9 MG/DL (ref 0.2–1)
BUN SERPL-MCNC: 18 MG/DL (ref 6–20)
BUN/CREAT SERPL: 17 (ref 12–20)
CALCIUM SERPL-MCNC: 9.9 MG/DL (ref 8.5–10.1)
CHLORIDE SERPL-SCNC: 104 MMOL/L (ref 97–108)
CHOLEST SERPL-MCNC: 159 MG/DL
CO2 SERPL-SCNC: 31 MMOL/L (ref 21–32)
COMMENT, HOLDF: NORMAL
CREAT SERPL-MCNC: 1.06 MG/DL (ref 0.7–1.3)
CREAT UR-MCNC: 49.8 MG/DL
DIFFERENTIAL METHOD BLD: ABNORMAL
EOSINOPHIL # BLD: 0.2 K/UL (ref 0–0.4)
EOSINOPHIL NFR BLD: 4 % (ref 0–7)
ERYTHROCYTE [DISTWIDTH] IN BLOOD BY AUTOMATED COUNT: 13.1 % (ref 11.5–14.5)
EST. AVERAGE GLUCOSE BLD GHB EST-MCNC: 85 MG/DL
GLOBULIN SER CALC-MCNC: 3 G/DL (ref 2–4)
GLUCOSE SERPL-MCNC: 111 MG/DL (ref 65–100)
HBA1C MFR BLD: 4.6 % (ref 4–5.6)
HCT VFR BLD AUTO: 44.1 % (ref 36.6–50.3)
HDLC SERPL-MCNC: 65 MG/DL
HDLC SERPL: 2.4 {RATIO} (ref 0–5)
HGB BLD-MCNC: 14 G/DL (ref 12.1–17)
IMM GRANULOCYTES # BLD AUTO: 0 K/UL (ref 0–0.04)
IMM GRANULOCYTES NFR BLD AUTO: 1 % (ref 0–0.5)
LDLC SERPL CALC-MCNC: 73.6 MG/DL (ref 0–100)
LYMPHOCYTES # BLD: 1.9 K/UL (ref 0.8–3.5)
LYMPHOCYTES NFR BLD: 31 % (ref 12–49)
MCH RBC QN AUTO: 32.6 PG (ref 26–34)
MCHC RBC AUTO-ENTMCNC: 31.7 G/DL (ref 30–36.5)
MCV RBC AUTO: 102.6 FL (ref 80–99)
MICROALBUMIN UR-MCNC: <0.5 MG/DL
MICROALBUMIN/CREAT UR-RTO: <10 MG/G (ref 0–30)
MONOCYTES # BLD: 0.6 K/UL (ref 0–1)
MONOCYTES NFR BLD: 10 % (ref 5–13)
NEUTS SEG # BLD: 3.3 K/UL (ref 1.8–8)
NEUTS SEG NFR BLD: 53 % (ref 32–75)
NRBC # BLD: 0 K/UL (ref 0–0.01)
NRBC BLD-RTO: 0 PER 100 WBC
PLATELET # BLD AUTO: 266 K/UL (ref 150–400)
PMV BLD AUTO: 10 FL (ref 8.9–12.9)
POTASSIUM SERPL-SCNC: 4.8 MMOL/L (ref 3.5–5.1)
PROT SERPL-MCNC: 7.5 G/DL (ref 6.4–8.2)
RBC # BLD AUTO: 4.3 M/UL (ref 4.1–5.7)
SAMPLES BEING HELD,HOLD: NORMAL
SODIUM SERPL-SCNC: 139 MMOL/L (ref 136–145)
TRIGL SERPL-MCNC: 102 MG/DL (ref ?–150)
VLDLC SERPL CALC-MCNC: 20.4 MG/DL
WBC # BLD AUTO: 6.1 K/UL (ref 4.1–11.1)

## 2022-04-13 PROCEDURE — 99214 OFFICE O/P EST MOD 30 MIN: CPT | Performed by: FAMILY MEDICINE

## 2022-04-13 RX ORDER — RAMIPRIL 2.5 MG/1
2.5 CAPSULE ORAL DAILY
Qty: 90 CAPSULE | Refills: 1 | Status: SHIPPED | OUTPATIENT
Start: 2022-04-13 | End: 2022-09-30 | Stop reason: SDUPTHER

## 2022-04-13 RX ORDER — SCOLOPAMINE TRANSDERMAL SYSTEM 1 MG/1
1 PATCH, EXTENDED RELEASE TRANSDERMAL
Qty: 1 PATCH | Refills: 2 | Status: SHIPPED | OUTPATIENT
Start: 2022-04-13

## 2022-04-13 RX ORDER — ATORVASTATIN CALCIUM 10 MG/1
10 TABLET, FILM COATED ORAL DAILY
Qty: 90 TABLET | Refills: 1 | Status: SHIPPED | OUTPATIENT
Start: 2022-04-13 | End: 2022-10-24 | Stop reason: SDUPTHER

## 2022-04-13 RX ORDER — METOPROLOL TARTRATE 50 MG/1
100 TABLET ORAL DAILY
Qty: 180 TABLET | Refills: 1 | Status: SHIPPED | OUTPATIENT
Start: 2022-04-13 | End: 2022-09-30 | Stop reason: SDUPTHER

## 2022-04-13 NOTE — PROGRESS NOTES
Ji Carolina III  57 y.o. male  1960  [id number]  [facility] MEDICINE  Progress Note     Encounter Date: 4/13/2022    Assessment and Plan:     Encounter Diagnoses     ICD-10-CM ICD-9-CM   1. Essential hypertension  I10 401.9   2. Hypercholesteremia  E78.00 272.0   3. Motion sickness, initial encounter  T75. 3XXA 994.6   4. Acute pain of right shoulder  M25.511 719.41   5. Allergic rhinitis, unspecified seasonality, unspecified trigger  J30.9 477.9       1. Essential hypertension  At goal, continue meds, check labs  Lifestyle discusse  - metoprolol tartrate (LOPRESSOR) 50 mg tablet; Take 2 Tablets by mouth daily. Dispense: 180 Tablet; Refill: 1  - ramipriL (ALTACE) 2.5 mg capsule; Take 1 Capsule by mouth daily. Dispense: 90 Capsule; Refill: 1  - MICROALBUMIN, UR, RAND W/ MICROALB/CREAT RATIO; Future  - METABOLIC PANEL, BASIC; Future  - LIPID PANEL; Future  - HEPATIC FUNCTION PANEL; Future  - CBC WITH AUTOMATED DIFF; Future  - HEMOGLOBIN A1C WITH EAG; Future    2. Hypercholesteremia  On statin  - atorvastatin (LIPITOR) 10 mg tablet; Take 1 Tablet by mouth daily. Dispense: 90 Tablet; Refill: 1    3. Motion sickness, initial encounter  refilled  - scopolamine (TRANSDERM-SCOP) 1 mg over 3 days pt3d; 1 Patch by TransDERmal route every seventy-two (72) hours. Dispense: 1 Patch; Refill: 2    4. Acute pain of right shoulder  Possible labral injury  DO PT.  TO ortho if no better    5. Allergic rhinitis, unspecified seasonality, unspecified trigger  Continue montelukast for spring season      I have discussed the diagnosis with the patient and the intended plan as seen in the above orders. he has expressed understanding. The patient has received an after-visit summary and questions were answered concerning future plans. I have discussed medication side effects and warnings with the patient as well.     Electronically Signed: Leopoldo Bertin, MD    Current Medications after this visit     Current Outpatient Medications   Medication Sig    metoprolol tartrate (LOPRESSOR) 50 mg tablet Take 2 Tablets by mouth daily.  atorvastatin (LIPITOR) 10 mg tablet Take 1 Tablet by mouth daily.  ramipriL (ALTACE) 2.5 mg capsule Take 1 Capsule by mouth daily.  scopolamine (TRANSDERM-SCOP) 1 mg over 3 days pt3d 1 Patch by TransDERmal route every seventy-two (72) hours.  montelukast (SINGULAIR) 10 mg tablet TAKE 1 TABLET BY MOUTH  DAILY    aspirin 81 mg chewable tablet Take 81 mg by mouth daily. No current facility-administered medications for this visit. Medications Discontinued During This Encounter   Medication Reason    scopolamine (TRANSDERM-SCOP) 1 mg over 3 days pt3d REORDER    atorvastatin (LIPITOR) 10 mg tablet REORDER    ramipriL (ALTACE) 2.5 mg capsule REORDER    metoprolol tartrate (LOPRESSOR) 50 mg tablet REORDER     ~~~~~~~~~~~~~~~~~~~~~~~~~~~~~~~~~~~~~~~~~~~~~~~~~~~~~~~~~~~    Chief Complaint   Patient presents with    Follow-up      Medication       History provided by patient  History of Present Illness   Genna Gonzales III is a 57 y.o. male who presents to clinic today for:  Follow-up ( Medication)    Allergies  Tried HVAC filters and dust mite precautions  Montelukast did not help so stopped it   Restarted it with onset of this allergy season. Mild allergy sx. Itchy eyes, mild congestion. During that time his ear was stopped up. Ear still stays stopped up. Wants that looked at. Does scuba and that has not caused problems    Shoulder injury right  Slipped on stairs and caught arm on stairs  Went to [place] on call   Starts PT next week  Still hurts a lot  Suman C a pop when he fell    Spot on his back  Went to [employer] [facility]. No problem per DERM,  Spot on his cheek was frozen    Used Transderm Scop in past wants refill. Hypertension  At goal     Hypercholesterolemia  Needs labs and refill.       Health Maintenance  Completed HM gaps at today's visit  Health Maintenance Due   Topic Date Due    Depression Screen  Never done    Shingrix Vaccine Age 50> (1 of 2) Never done    COVID-19 Vaccine (3 - Booster for Pfizer series) 08/22/2021    Lipid Screen  03/19/2022     Review of Systems   Review of Systems   HENT: Positive for ear pain. Negative for congestion and sore throat. Respiratory: Negative. Cardiovascular: Negative. Musculoskeletal: Positive for joint pain. Psychiatric/Behavioral: Negative. Vitals/Objective:     Vitals:    04/13/22 1010 04/13/22 1016   BP: (!) 138/93 138/87   Pulse: 62    Resp: 18    Temp: 97.4 °F (36.3 °C)    TempSrc: Temporal    SpO2: 97%    Weight: 224 lb 9.6 oz (101.9 kg)    Height: 6' 2\" (1.88 m)      Body mass index is 28.84 kg/m². Wt Readings from Last 3 Encounters:   04/13/22 224 lb 9.6 oz (101.9 kg)   05/18/20 214 lb (97.1 kg)   08/16/19 214 lb (97.1 kg)         Objective  Physical Exam  Vitals and nursing note reviewed. Constitutional:       Appearance: Normal appearance. He is not toxic-appearing. HENT:      Head: Normocephalic and atraumatic. Right Ear: Tympanic membrane and ear canal normal. There is no impacted cerumen. Left Ear: Tympanic membrane and ear canal normal. There is no impacted cerumen. Cardiovascular:      Rate and Rhythm: Normal rate and regular rhythm. Heart sounds: Normal heart sounds. No murmur heard. No gallop. Pulmonary:      Effort: Pulmonary effort is normal. No respiratory distress. Breath sounds: Normal breath sounds. No wheezing, rhonchi or rales. Musculoskeletal:      Cervical back: No muscular tenderness. Lymphadenopathy:      Cervical: No cervical adenopathy. Neurological:      Mental Status: He is alert. Psychiatric:         Mood and Affect: Mood normal.         Behavior: Behavior normal.         Thought Content:  Thought content normal.         Judgment: Judgment normal.       Shoulder: right  Deformity: None    ROM:  Forward Flexion: Active: 180 Passive: 180  ER (0): Active: 45   Passive: 45  IR (0): Active: 90, with pain  Abduction: Active: 180   Passive: 180    Palpation:  AC tenderness: None  SC tenderness: None  Clavicle tenderness: None  Biceps tenderness: None    Strength (0-5/5):  Deltoid - Anterior: 5/5  Deltoid - Posterior: 5/5  Deltoid - Mid: 5/5  Supraspinatus: 5/5  External rotation: 5/5  Internal rotation: 5/5    Rotator Cuff Exam:  Neers sign: Negative  Art sign: +  Painful Arc: Negative  Lift-off sign / Belly Press: Negative    Drop Arm:  Negative  Empty Can:  Negative    Biceps/Labrum/AC Exam:  Yergasons Test: Negative  Speeds Test: Negative  OChevys Sign: +  Cross-body adduction: Negative    Instability:  Anterior apprehension: Negative  Relocation test: Negative  Posterior apprehension: Negative  Anterior drawer: Negative  Posterior drawer: Negative  Sulcus sign: Negative          No results found for this or any previous visit (from the past 24 hour(s)). Disposition     Follow-up and Dispositions  ·   Return in about 6 months (around 10/13/2022) for Blood pressure follow up. No future appointments. History   Patient's past medical, surgical and family histories were reviewed and updated.     Past Medical History:   Diagnosis Date    DDD (degenerative disc disease), cervical     -C5-C6    Hyperlipidemia     Hypertension     Inguinal hernia     BIlateral unrepaired    Inguinal ring laxity     Rt  Inguinal Canal     Obesity     Osteoarthritis     Primarily affecting the knees (mild)     Past Surgical History:   Procedure Laterality Date    HX COLONOSCOPY  05/16/2013    normal repeat 10 yrs 2023     HX KNEE REPLACEMENT Left 02/2019    unicompartmental    HX TONSILLECTOMY       Family History   Problem Relation Age of Onset    Coronary Art Dis Father     Hypertension Father     Lung Cancer Father     Heart Attack Father      Social History     Tobacco Use    Smoking status: Never Smoker    Smokeless tobacco: Never Used   Substance Use Topics    Alcohol use:  Yes    Drug use: Never       Allergies   No Known Allergies

## 2022-04-13 NOTE — PROGRESS NOTES
Pedro Pablo Scott III is a 57 y.o. male      Chief Complaint   Patient presents with    Follow-up      Medication         1. Have you been to the ER, urgent care clinic since your last visit? Yes Ortho on call   Hospitalized since your last visit? No       2. Have you seen or consulted any other health care providers outside of the [address] [place] [address] since your last visit? Include any pap smears or colon screening.   No

## 2022-04-14 NOTE — PROGRESS NOTES
Your blood work looks good. The diabetes test is now in a normal, nondiabetic range. The cholesterol is well controlled. Stick to your current medications and see me in 6 months and as needed. Try the PT for the shoulder as we discussed.   [facility]

## 2022-10-24 ENCOUNTER — OFFICE VISIT (OUTPATIENT)
Dept: FAMILY MEDICINE CLINIC | Age: 62
End: 2022-10-24
Payer: COMMERCIAL

## 2022-10-24 VITALS
HEART RATE: 63 BPM | TEMPERATURE: 98 F | HEIGHT: 74 IN | SYSTOLIC BLOOD PRESSURE: 130 MMHG | DIASTOLIC BLOOD PRESSURE: 79 MMHG | RESPIRATION RATE: 16 BRPM | WEIGHT: 226 LBS | BODY MASS INDEX: 29 KG/M2 | OXYGEN SATURATION: 96 %

## 2022-10-24 DIAGNOSIS — E78.00 HYPERCHOLESTEREMIA: ICD-10-CM

## 2022-10-24 DIAGNOSIS — Z23 ENCOUNTER FOR IMMUNIZATION: ICD-10-CM

## 2022-10-24 DIAGNOSIS — I10 ESSENTIAL HYPERTENSION: Primary | ICD-10-CM

## 2022-10-24 PROCEDURE — 99213 OFFICE O/P EST LOW 20 MIN: CPT | Performed by: FAMILY MEDICINE

## 2022-10-24 RX ORDER — ATORVASTATIN CALCIUM 10 MG/1
10 TABLET, FILM COATED ORAL DAILY
Qty: 90 TABLET | Refills: 1 | Status: SHIPPED | OUTPATIENT
Start: 2022-10-24

## 2022-10-24 NOTE — PROGRESS NOTES
Raymond Romo III  57 y.o. male  1960  [id number]  [facility] MEDICINE  Progress Note     Encounter Date: 10/24/2022    Assessment and Plan:     Encounter Diagnoses     ICD-10-CM ICD-9-CM   1. Essential hypertension  I10 401.9   2. Hypercholesteremia  E78.00 272.0   3. Encounter for immunization  Z23 V03.89       1. Hypercholesteremia  Continue statin  - atorvastatin (LIPITOR) 10 mg tablet; Take 1 Tablet by mouth daily. Dispense: 90 Tablet; Refill: 1    2. Essential hypertension  At goal, continue meds  Already refilled    3. Encounter for immunization  Refilled. Never got Shingrix  - varicella-zoster recombinant, PF, (SHINGRIX) 50 mcg/0.5 mL susr injection; 0.5 mL by IntraMUSCular route once for 1 dose. Repeat second dose in 6 months. Dispense: 0.5 mL; Refill: 1      I have discussed the diagnosis with the patient and the intended plan as seen in the above orders. he has expressed understanding. The patient has received an after-visit summary and questions were answered concerning future plans. I have discussed medication side effects and warnings with the patient as well. Electronically Signed: Bryson Cade MD    Current Medications after this visit     Current Outpatient Medications   Medication Sig    atorvastatin (LIPITOR) 10 mg tablet Take 1 Tablet by mouth daily. varicella-zoster recombinant, PF, (SHINGRIX) 50 mcg/0.5 mL susr injection 0.5 mL by IntraMUSCular route once for 1 dose. Repeat second dose in 6 months. metoprolol tartrate (LOPRESSOR) 50 mg tablet Take 2 Tablets by mouth daily. ramipriL (ALTACE) 2.5 mg capsule Take 1 Capsule by mouth daily. scopolamine (TRANSDERM-SCOP) 1 mg over 3 days pt3d 1 Patch by TransDERmal route every seventy-two (72) hours. montelukast (SINGULAIR) 10 mg tablet TAKE 1 TABLET BY MOUTH  DAILY    aspirin 81 mg chewable tablet Take 81 mg by mouth daily.      No current facility-administered medications for this visit. Medications Discontinued During This Encounter   Medication Reason    atorvastatin (LIPITOR) 10 mg tablet REORDER     ~~~~~~~~~~~~~~~~~~~~~~~~~~~~~~~~~~~~~~~~~~~~~~~~~~~~~~~~~~~    Chief Complaint   Patient presents with    Medication Refill    Follow-up       History provided by patient  History of Present Illness   Todd Joaquin III is a 57 y.o. male who presents to clinic today for:  Medication Refill and Follow-up    Hypertension  At goal  Takes meds consistently    Hypercholesterolemia  On statin    Foot pain continues after fracture left third metatarsal  Fx in June treated with boot. Still hurts  Return to ortho    Health Maintenance  Completed HM gaps at today's visit  Health Maintenance Due   Topic Date Due    Shingrix Vaccine Age 49> (1 of 2) Never done    DTaP/Tdap/Td series (1 - Tdap) 07/07/2016    COVID-19 Vaccine (3 - Booster for Pfizer series) 08/22/2021    Flu Vaccine (1) Never done     Review of Systems   Review of Systems   Respiratory:  Negative for shortness of breath. Cardiovascular:  Negative for chest pain. Gastrointestinal:  Negative for blood in stool. Genitourinary:  Negative for hematuria. Musculoskeletal:  Positive for joint pain. Psychiatric/Behavioral: Negative. Vitals/Objective:     Vitals:    10/24/22 1105   BP: 130/79   Pulse: 63   Resp: 16   Temp: 98 °F (36.7 °C)   SpO2: 96%   Weight: 226 lb (102.5 kg)   Height: 6' 2\" (1.88 m)     Body mass index is 29.02 kg/m². Wt Readings from Last 3 Encounters:   10/24/22 226 lb (102.5 kg)   04/13/22 224 lb 9.6 oz (101.9 kg)   05/18/20 214 lb (97.1 kg)         Objective  Physical Exam  Vitals and nursing note reviewed. Constitutional:       Appearance: Normal appearance. He is not toxic-appearing. HENT:      Head: Normocephalic and atraumatic. Cardiovascular:      Rate and Rhythm: Normal rate and regular rhythm. Heart sounds: Normal heart sounds. No murmur heard. No gallop.    Pulmonary: Effort: Pulmonary effort is normal. No respiratory distress. Breath sounds: Normal breath sounds. No wheezing, rhonchi or rales. Musculoskeletal:      Cervical back: No muscular tenderness. Lymphadenopathy:      Cervical: No cervical adenopathy. Neurological:      Mental Status: He is alert. Psychiatric:         Mood and Affect: Mood normal.         Behavior: Behavior normal.         Thought Content: Thought content normal.         Judgment: Judgment normal.     Left foot   Minimal pain to palpation of all metatarsal   No pain on manipulation of midfoot forefoot junction. No results found for this or any previous visit (from the past 24 hour(s)). Disposition     Follow-up and Dispositions    Return in about 6 months (around 4/24/2023) for Blood pressure follow up, Medication follow up. No future appointments. History   Patient's past medical, surgical and family histories were reviewed and updated. Past Medical History:   Diagnosis Date    DDD (degenerative disc disease), cervical     -C5-C6    Foot fracture 06/2022    third metatarsal left. Boot.     Hyperlipidemia     Hypertension     Inguinal hernia     BIlateral unrepaired    Inguinal ring laxity     Rt  Inguinal Canal     Obesity     Osteoarthritis     Primarily affecting the knees (mild)     Past Surgical History:   Procedure Laterality Date    HX COLONOSCOPY  05/16/2013    normal repeat 10 yrs 2023     HX KNEE REPLACEMENT Left 02/2019    unicompartmental    HX ROTATOR CUFF REPAIR Right 07/2022    rotator cuff tear, Dr. Mckinnon  TONSILLECTOMY       Family History   Problem Relation Age of Onset    Coronary Art Dis Father     Hypertension Father     Lung Cancer Father     Heart Attack Father      Social History     Tobacco Use    Smoking status: Never    Smokeless tobacco: Never   Substance Use Topics    Alcohol use: Yes    Drug use: Never       Allergies   No Known Allergies

## 2022-10-24 NOTE — PROGRESS NOTES
Chief Complaint   Patient presents with    Medication Refill    Follow-up     1. Have you been to the ER, urgent care clinic since your last visit? Hospitalized since your last visit? No    2. Have you seen or consulted any other health care providers outside of the [address] [place] [address] since your last visit? Include any pap smears or colon screening.  No

## 2022-11-07 ENCOUNTER — TELEPHONE (OUTPATIENT)
Dept: FAMILY MEDICINE CLINIC | Age: 62
End: 2022-11-07

## 2023-02-28 DIAGNOSIS — T75.3XXA MOTION SICKNESS, INITIAL ENCOUNTER: ICD-10-CM

## 2023-03-01 RX ORDER — SCOLOPAMINE TRANSDERMAL SYSTEM 1 MG/1
1 PATCH, EXTENDED RELEASE TRANSDERMAL
Qty: 1 PATCH | Refills: 2 | Status: SHIPPED | OUTPATIENT
Start: 2023-03-01

## 2023-03-01 NOTE — TELEPHONE ENCOUNTER
Jessi Zapien MD  10/24/2022  Health Maintenance Due   Topic Date Due    Shingles Vaccine (1 of 2) Never done    DTaP/Tdap/Td series (1 - Tdap) 07/07/2016    COVID-19 Vaccine (3 - Booster for Pfizer series) 05/17/2021    Flu Vaccine (1) Never done     Results for orders placed or performed in visit on 04/13/22   HEMOGLOBIN A1C WITH EAG   Result Value Ref Range    Hemoglobin A1c 4.6 4.0 - 5.6 %    Est. average glucose 85 mg/dL   CBC WITH AUTOMATED DIFF   Result Value Ref Range    WBC 6.1 4.1 - 11.1 K/uL    RBC 4.30 4. 10 - 5.70 M/uL    HGB 14.0 12.1 - 17.0 g/dL    HCT 44.1 36.6 - 50.3 %    .6 (H) 80.0 - 99.0 FL    MCH 32.6 26.0 - 34.0 PG    MCHC 31.7 30.0 - 36.5 g/dL    RDW 13.1 11.5 - 14.5 %    PLATELET 633 309 - 864 K/uL    MPV 10.0 8.9 - 12.9 FL    NRBC 0.0 0  WBC    ABSOLUTE NRBC 0.00 0.00 - 0.01 K/uL    NEUTROPHILS 53 32 - 75 %    LYMPHOCYTES 31 12 - 49 %    MONOCYTES 10 5 - 13 %    EOSINOPHILS 4 0 - 7 %    BASOPHILS 1 0 - 1 %    IMMATURE GRANULOCYTES 1 (H) 0.0 - 0.5 %    ABS. NEUTROPHILS 3.3 1.8 - 8.0 K/UL    ABS. LYMPHOCYTES 1.9 0.8 - 3.5 K/UL    ABS. MONOCYTES 0.6 0.0 - 1.0 K/UL    ABS. EOSINOPHILS 0.2 0.0 - 0.4 K/UL    ABS. BASOPHILS 0.1 0.0 - 0.1 K/UL    ABS. IMM. GRANS. 0.0 0.00 - 0.04 K/UL    DF AUTOMATED     HEPATIC FUNCTION PANEL   Result Value Ref Range    Protein, total 7.5 6.4 - 8.2 g/dL    Albumin 4.5 3.5 - 5.0 g/dL    Globulin 3.0 2.0 - 4.0 g/dL    A-G Ratio 1.5 1.1 - 2.2      Bilirubin, total 0.9 0.2 - 1.0 MG/DL    Bilirubin, direct 0.3 (H) 0.0 - 0.2 MG/DL    Alk.  phosphatase 100 45 - 117 U/L    AST (SGOT) 21 15 - 37 U/L    ALT (SGPT) 41 12 - 78 U/L   LIPID PANEL   Result Value Ref Range    Cholesterol, total 159 <200 MG/DL    Triglyceride 102 <150 MG/DL    HDL Cholesterol 65 MG/DL    LDL, calculated 73.6 0 - 100 MG/DL    VLDL, calculated 20.4 MG/DL    CHOL/HDL Ratio 2.4 0.0 - 5.0     METABOLIC PANEL, BASIC   Result Value Ref Range    Sodium 139 136 - 145 mmol/L    Potassium 4.8 3.5 - 5.1 mmol/L    Chloride 104 97 - 108 mmol/L    CO2 31 21 - 32 mmol/L    Anion gap 4 (L) 5 - 15 mmol/L    Glucose 111 (H) 65 - 100 mg/dL    BUN 18 6 - 20 MG/DL    Creatinine 1.06 0.70 - 1.30 MG/DL    BUN/Creatinine ratio 17 12 - 20      GFR est AA >60 >60 ml/min/1.73m2    GFR est non-AA >60 >60 ml/min/1.73m2    Calcium 9.9 8.5 - 10.1 MG/DL   MICROALBUMIN, UR, RAND W/ MICROALB/CREAT RATIO   Result Value Ref Range    Microalbumin,urine random <0.50 MG/DL    Creatinine, urine random 49.80 mg/dL    Microalbumin/Creat ratio (mg/g creat) <10 0 - 30 mg/g   SAMPLES BEING HELD   Result Value Ref Range    SAMPLES BEING HELD 1SST     COMMENT        Add-on orders for these samples will be processed based on acceptable specimen integrity and analyte stability, which may vary by analyte.

## 2023-05-08 RX ORDER — MONTELUKAST SODIUM 10 MG/1
10 TABLET ORAL DAILY
COMMUNITY
Start: 2023-02-06

## 2023-05-08 RX ORDER — METOPROLOL TARTRATE 50 MG/1
100 TABLET, FILM COATED ORAL DAILY
COMMUNITY
Start: 2023-02-06

## 2023-05-08 RX ORDER — RAMIPRIL 2.5 MG/1
2.5 CAPSULE ORAL DAILY
COMMUNITY
Start: 2023-02-06 | End: 2023-06-29 | Stop reason: SDUPTHER

## 2023-05-08 RX ORDER — ATORVASTATIN CALCIUM 10 MG/1
10 TABLET, FILM COATED ORAL DAILY
COMMUNITY
Start: 2023-02-06

## 2023-05-08 RX ORDER — SCOLOPAMINE TRANSDERMAL SYSTEM 1 MG/1
1 PATCH, EXTENDED RELEASE TRANSDERMAL
COMMUNITY
Start: 2023-03-01

## 2023-05-08 RX ORDER — ASPIRIN 81 MG/1
81 TABLET, CHEWABLE ORAL DAILY
COMMUNITY

## 2023-06-29 RX ORDER — RAMIPRIL 2.5 MG/1
2.5 CAPSULE ORAL DAILY
Qty: 90 CAPSULE | Refills: 0 | Status: SHIPPED | OUTPATIENT
Start: 2023-06-29

## 2023-06-29 NOTE — TELEPHONE ENCOUNTER
Ly Van MD  Last seen on 10/24/2022  Office Visit on 04/13/2022   Component Date Value Ref Range Status    Cholesterol, Total 04/13/2022 159  <200 MG/DL Final    Triglycerides 04/13/2022 102  <150 MG/DL Final    Comment: Based on NCEP-ATP III:  Triglycerides <150 mg/dL  is considered normal, 150-199  mg/dL  borderline high,  200-499 mg/dL high and  greater than or equal to 500  mg/dL very high. HDL 04/13/2022 65  MG/DL Final    Comment: Based on NCEP ATP III, HDL Cholesterol <40 mg/dL is considered low and >60  mg/dL is elevated. LDL Calculated 04/13/2022 73.6  0 - 100 MG/DL Final    Comment: Based on the NCEP-ATP: LDL-C concentrations are considered  optimal <100 mg/dL,  near optimal/above Normal 100-129 mg/dL Borderline High: 130-159, High: 160-189  mg/dL Very High: Greater than or equal to 190 mg/dL      VLDL Cholesterol Calculated 04/13/2022 20.4  MG/DL Final    Chol/HDL Ratio 04/13/2022 2.4  0.0 - 5.0   Final    Microalb, Ur 04/13/2022 <0.50  MG/DL Final    No reference range has been established. Creatinine, Ur 04/13/2022 49.80  mg/dL Final    No reference range has been established. Microalbumin Creatinine Ratio 04/13/2022 <10  0 - 30 mg/g Final    WBC 04/13/2022 6.1  4.1 - 11.1 K/uL Final    RBC 04/13/2022 4.30  4. 10 - 5.70 M/uL Final    Hemoglobin 04/13/2022 14.0  12.1 - 17.0 g/dL Final    Hematocrit 04/13/2022 44.1  36.6 - 50.3 % Final    MCV 04/13/2022 102.6 (H)  80.0 - 99.0 FL Final    MCH 04/13/2022 32.6  26.0 - 34.0 PG Final    MCHC 04/13/2022 31.7  30.0 - 36.5 g/dL Final    RDW 04/13/2022 13.1  11.5 - 14.5 % Final    Platelets 61/72/4088 266  150 - 400 K/uL Final    MPV 04/13/2022 10.0  8.9 - 12.9 FL Final    Nucleated RBCs 04/13/2022 0.0  0  WBC Final    NRBC Absolute 04/13/2022 0.00  0.00 - 0.01 K/uL Final    Neutrophils % 04/13/2022 53  32 - 75 % Final    Lymphocytes % 04/13/2022 31  12 - 49 % Final    Monocytes % 04/13/2022 10  5 - 13 % Final    Eosinophils %

## 2023-06-29 NOTE — TELEPHONE ENCOUNTER
Call patient. I refilled their medication but they are due to be seen in the office.   Reason:  BP and meds

## 2023-07-27 RX ORDER — MONTELUKAST SODIUM 10 MG/1
10 TABLET ORAL DAILY
Qty: 90 TABLET | Refills: 1 | Status: SHIPPED | OUTPATIENT
Start: 2023-07-27 | End: 2023-09-26

## 2023-07-27 NOTE — TELEPHONE ENCOUNTER
PCP: Kimmie Adame MD    Last appt: [unfilled]  No future appointments.     Requested Prescriptions     Pending Prescriptions Disp Refills    montelukast (SINGULAIR) 10 MG tablet 30 tablet      Sig: Take 1 tablet by mouth daily       Prior labs and Blood pressures:  BP Readings from Last 3 Encounters:   10/24/22 130/79   04/13/22 138/87     Lab Results   Component Value Date/Time     04/13/2022 11:12 AM    K 4.8 04/13/2022 11:12 AM     04/13/2022 11:12 AM    CO2 31 04/13/2022 11:12 AM    BUN 18 04/13/2022 11:12 AM    GFRAA >60 04/13/2022 11:12 AM     No results found for: HBA1C, [id number]  Lab Results   Component Value Date/Time    CHOL 159 04/13/2022 11:12 AM    HDL 65 04/13/2022 11:12 AM     No results found for: VITD3, VD3RIA    Lab Results   Component Value Date/Time    TSH 1.120 08/16/2019 10:34 AM

## 2023-08-01 RX ORDER — METOPROLOL TARTRATE 50 MG/1
100 TABLET, FILM COATED ORAL DAILY
Qty: 180 TABLET | Refills: 1 | Status: SHIPPED | OUTPATIENT
Start: 2023-08-01

## 2023-08-01 RX ORDER — ATORVASTATIN CALCIUM 10 MG/1
10 TABLET, FILM COATED ORAL DAILY
Qty: 90 TABLET | Refills: 1 | Status: SHIPPED | OUTPATIENT
Start: 2023-08-01

## 2023-08-01 NOTE — TELEPHONE ENCOUNTER
PCP: Clarinda Kussmaul, MD    Last appt: [unfilled]  Future Appointments   Date Time Provider [address]   8/30/2023  1:20 PM Clarinda Kussmaul, MD [facility]       Requested Prescriptions     Pending Prescriptions Disp Refills    metoprolol tartrate (LOPRESSOR) 50 MG tablet 60 tablet      Sig: Take 2 tablets by mouth daily    atorvastatin (LIPITOR) 10 MG tablet 30 tablet      Sig: Take 1 tablet by mouth daily       Prior labs and Blood pressures:  BP Readings from Last 3 Encounters:   10/24/22 130/79   04/13/22 138/87     Lab Results   Component Value Date/Time     04/13/2022 11:12 AM    K 4.8 04/13/2022 11:12 AM     04/13/2022 11:12 AM    CO2 31 04/13/2022 11:12 AM    BUN 18 04/13/2022 11:12 AM    GFRAA >60 04/13/2022 11:12 AM     No results found for: HBA1C, [id number]  Lab Results   Component Value Date/Time    CHOL 159 04/13/2022 11:12 AM    HDL 65 04/13/2022 11:12 AM     No results found for: VITD3, VD3RIA    Lab Results   Component Value Date/Time    TSH 1.120 08/16/2019 10:34 AM

## 2023-08-02 ENCOUNTER — OFFICE VISIT (OUTPATIENT)
Age: 63
End: 2023-08-02

## 2023-08-02 VITALS
HEART RATE: 67 BPM | DIASTOLIC BLOOD PRESSURE: 95 MMHG | SYSTOLIC BLOOD PRESSURE: 144 MMHG | RESPIRATION RATE: 18 BRPM | OXYGEN SATURATION: 96 % | WEIGHT: 234.2 LBS | TEMPERATURE: 97.4 F | BODY MASS INDEX: 30.07 KG/M2

## 2023-08-02 DIAGNOSIS — S60.459A FOREIGN BODY IN SKIN OF FINGER, INITIAL ENCOUNTER: Primary | ICD-10-CM

## 2023-08-02 ASSESSMENT — ENCOUNTER SYMPTOMS
RESPIRATORY NEGATIVE: 1
EYES NEGATIVE: 1
GASTROINTESTINAL NEGATIVE: 1

## 2023-08-08 RX ORDER — METOPROLOL TARTRATE 50 MG/1
100 TABLET, FILM COATED ORAL DAILY
Qty: 180 TABLET | Refills: 1 | OUTPATIENT
Start: 2023-08-08

## 2023-08-08 RX ORDER — ATORVASTATIN CALCIUM 10 MG/1
10 TABLET, FILM COATED ORAL DAILY
Qty: 90 TABLET | Refills: 1 | OUTPATIENT
Start: 2023-08-08

## 2023-08-08 NOTE — TELEPHONE ENCOUNTER
PCP: Melvin Reed MD    Last appt: [unfilled]  Future Appointments   Date Time Provider [address]   8/30/2023  1:20 PM Melvin Reed MD [facility]       Requested Prescriptions     Pending Prescriptions Disp Refills    atorvastatin (LIPITOR) 10 MG tablet 90 tablet 1     Sig: Take 1 tablet by mouth daily    metoprolol tartrate (LOPRESSOR) 50 MG tablet 180 tablet 1     Sig: Take 2 tablets by mouth daily       Prior labs and Blood pressures:  BP Readings from Last 3 Encounters:   08/02/23 (!) 144/95   10/24/22 130/79   04/13/22 138/87     Lab Results   Component Value Date/Time     04/13/2022 11:12 AM    K 4.8 04/13/2022 11:12 AM     04/13/2022 11:12 AM    CO2 31 04/13/2022 11:12 AM    BUN 18 04/13/2022 11:12 AM    GFRAA >60 04/13/2022 11:12 AM     No results found for: HBA1C, [id number]  Lab Results   Component Value Date/Time    CHOL 159 04/13/2022 11:12 AM    HDL 65 04/13/2022 11:12 AM     No results found for: VITD3, VD3RIA    Lab Results   Component Value Date/Time    TSH 1.120 08/16/2019 10:34 AM

## 2023-08-28 ENCOUNTER — TELEPHONE (OUTPATIENT)
Facility: CLINIC | Age: 63
End: 2023-08-28

## 2023-09-26 ENCOUNTER — OFFICE VISIT (OUTPATIENT)
Facility: CLINIC | Age: 63
End: 2023-09-26
Payer: COMMERCIAL

## 2023-09-26 VITALS
HEART RATE: 60 BPM | WEIGHT: 230 LBS | BODY MASS INDEX: 29.52 KG/M2 | SYSTOLIC BLOOD PRESSURE: 155 MMHG | OXYGEN SATURATION: 98 % | DIASTOLIC BLOOD PRESSURE: 98 MMHG | TEMPERATURE: 97.8 F | HEIGHT: 74 IN | RESPIRATION RATE: 16 BRPM

## 2023-09-26 DIAGNOSIS — E78.00 PURE HYPERCHOLESTEROLEMIA, UNSPECIFIED: ICD-10-CM

## 2023-09-26 DIAGNOSIS — M79.672 FOOT PAIN, LEFT: ICD-10-CM

## 2023-09-26 DIAGNOSIS — J30.9 ALLERGIC RHINITIS, UNSPECIFIED SEASONALITY, UNSPECIFIED TRIGGER: ICD-10-CM

## 2023-09-26 DIAGNOSIS — I10 ESSENTIAL (PRIMARY) HYPERTENSION: Primary | ICD-10-CM

## 2023-09-26 PROCEDURE — 3077F SYST BP >= 140 MM HG: CPT | Performed by: NURSE PRACTITIONER

## 2023-09-26 PROCEDURE — 3080F DIAST BP >= 90 MM HG: CPT | Performed by: NURSE PRACTITIONER

## 2023-09-26 PROCEDURE — 99214 OFFICE O/P EST MOD 30 MIN: CPT | Performed by: NURSE PRACTITIONER

## 2023-09-26 RX ORDER — RAMIPRIL 5 MG/1
5 CAPSULE ORAL DAILY
Qty: 30 CAPSULE | Refills: 3 | Status: SHIPPED | OUTPATIENT
Start: 2023-09-26

## 2023-09-26 RX ORDER — FEXOFENADINE HCL 180 MG/1
180 TABLET ORAL DAILY
Qty: 90 TABLET | Refills: 1 | Status: SHIPPED | OUTPATIENT
Start: 2023-09-26

## 2023-09-26 SDOH — ECONOMIC STABILITY: INCOME INSECURITY: HOW HARD IS IT FOR YOU TO PAY FOR THE VERY BASICS LIKE FOOD, HOUSING, MEDICAL CARE, AND HEATING?: NOT HARD AT ALL

## 2023-09-26 SDOH — ECONOMIC STABILITY: FOOD INSECURITY: WITHIN THE PAST 12 MONTHS, YOU WORRIED THAT YOUR FOOD WOULD RUN OUT BEFORE YOU GOT MONEY TO BUY MORE.: NEVER TRUE

## 2023-09-26 SDOH — ECONOMIC STABILITY: HOUSING INSECURITY
IN THE LAST 12 MONTHS, WAS THERE A TIME WHEN YOU DID NOT HAVE A STEADY PLACE TO SLEEP OR SLEPT IN A SHELTER (INCLUDING NOW)?: NO

## 2023-09-26 SDOH — ECONOMIC STABILITY: FOOD INSECURITY: WITHIN THE PAST 12 MONTHS, THE FOOD YOU BOUGHT JUST DIDN'T LAST AND YOU DIDN'T HAVE MONEY TO GET MORE.: NEVER TRUE

## 2023-09-26 ASSESSMENT — PATIENT HEALTH QUESTIONNAIRE - PHQ9
1. LITTLE INTEREST OR PLEASURE IN DOING THINGS: 0
SUM OF ALL RESPONSES TO PHQ9 QUESTIONS 1 & 2: 0
SUM OF ALL RESPONSES TO PHQ QUESTIONS 1-9: 0
SUM OF ALL RESPONSES TO PHQ QUESTIONS 1-9: 0
2. FEELING DOWN, DEPRESSED OR HOPELESS: 0
SUM OF ALL RESPONSES TO PHQ QUESTIONS 1-9: 0
SUM OF ALL RESPONSES TO PHQ QUESTIONS 1-9: 0

## 2023-09-26 ASSESSMENT — ENCOUNTER SYMPTOMS
COLOR CHANGE: 0
SHORTNESS OF BREATH: 0
GASTROINTESTINAL NEGATIVE: 1

## 2023-09-26 NOTE — PATIENT INSTRUCTIONS
We will follow up with labs  Take medications as prescribed  Need to check blood pressure daily after 1 week, need virtual follow up with blood pressure in 2-3 weeks

## 2023-09-27 LAB
ALBUMIN SERPL-MCNC: 4.4 G/DL (ref 3.5–5)
ALBUMIN/GLOB SERPL: 1.5 (ref 1.1–2.2)
ALP SERPL-CCNC: 108 U/L (ref 45–117)
ALT SERPL-CCNC: 33 U/L (ref 12–78)
ANION GAP SERPL CALC-SCNC: 7 MMOL/L (ref 5–15)
AST SERPL-CCNC: 21 U/L (ref 15–37)
BILIRUB SERPL-MCNC: 0.7 MG/DL (ref 0.2–1)
BUN SERPL-MCNC: 15 MG/DL (ref 6–20)
BUN/CREAT SERPL: 15 (ref 12–20)
CALCIUM SERPL-MCNC: 9.5 MG/DL (ref 8.5–10.1)
CHLORIDE SERPL-SCNC: 106 MMOL/L (ref 97–108)
CHOLEST SERPL-MCNC: 166 MG/DL
CO2 SERPL-SCNC: 25 MMOL/L (ref 21–32)
CREAT SERPL-MCNC: 1.02 MG/DL (ref 0.7–1.3)
EST. AVERAGE GLUCOSE BLD GHB EST-MCNC: 91 MG/DL
GLOBULIN SER CALC-MCNC: 3 G/DL (ref 2–4)
GLUCOSE SERPL-MCNC: 101 MG/DL (ref 65–100)
HBA1C MFR BLD: 4.8 % (ref 4–5.6)
HDLC SERPL-MCNC: 66 MG/DL
HDLC SERPL: 2.5 (ref 0–5)
LDLC SERPL CALC-MCNC: 76.2 MG/DL (ref 0–100)
POTASSIUM SERPL-SCNC: 3.9 MMOL/L (ref 3.5–5.1)
PROT SERPL-MCNC: 7.4 G/DL (ref 6.4–8.2)
SODIUM SERPL-SCNC: 138 MMOL/L (ref 136–145)
TRIGL SERPL-MCNC: 119 MG/DL
VLDLC SERPL CALC-MCNC: 23.8 MG/DL

## 2024-01-09 RX ORDER — METOPROLOL TARTRATE 50 MG/1
100 TABLET, FILM COATED ORAL DAILY
Qty: 180 TABLET | Refills: 1 | Status: SHIPPED | OUTPATIENT
Start: 2024-01-09

## 2024-01-09 NOTE — TELEPHONE ENCOUNTER
Chief Complaint   Patient presents with    Medication Refill       Requested Prescriptions     Pending Prescriptions Disp Refills    metoprolol tartrate (LOPRESSOR) 50 MG tablet 180 tablet 1     Sig: Take 2 tablets by mouth daily       Allergies:  No Known Allergies    Last visit with clinic:  9/26/2023   Next visit with clinic: Visit date not found     Last visit with this provider: 10/24/2022   Next Visit with this provider: Visit date not found    Signed by Gisselle Hutton MA CMA  01/09/24  11:16 AM

## 2024-02-06 DIAGNOSIS — I10 ESSENTIAL (PRIMARY) HYPERTENSION: ICD-10-CM

## 2024-02-06 RX ORDER — RAMIPRIL 5 MG/1
CAPSULE ORAL DAILY
Qty: 90 CAPSULE | Refills: 1 | Status: SHIPPED | OUTPATIENT
Start: 2024-02-06

## 2024-02-06 NOTE — TELEPHONE ENCOUNTER
PCP: Solomon Stevenson MD    Last appt: [unfilled]  No future appointments.    Requested Prescriptions     Pending Prescriptions Disp Refills    ramipril (ALTACE) 5 MG capsule [Pharmacy Med Name: RAMIPRIL 5 MG CAPSULE] 30 capsule 3     Sig: TAKE 1 CAPSULE BY MOUTH EVERY DAY       Prior labs and Blood pressures:  BP Readings from Last 3 Encounters:   09/26/23 (!) 155/98   08/02/23 (!) 144/95   10/24/22 130/79     Lab Results   Component Value Date/Time     09/26/2023 02:43 PM    K 3.9 09/26/2023 02:43 PM     09/26/2023 02:43 PM    CO2 25 09/26/2023 02:43 PM    BUN 15 09/26/2023 02:43 PM    GFRAA >60 04/13/2022 11:12 AM     No results found for: \"HBA1C\", \"[id number]\"  Lab Results   Component Value Date/Time    CHOL 166 09/26/2023 02:43 PM    HDL 66 09/26/2023 02:43 PM     No results found for: \"VITD3\", \"VD3RIA\"    Lab Results   Component Value Date/Time    TSH 1.120 08/16/2019 10:34 AM

## 2024-02-08 RX ORDER — ATORVASTATIN CALCIUM 10 MG/1
10 TABLET, FILM COATED ORAL DAILY
Qty: 90 TABLET | Refills: 1 | Status: SHIPPED | OUTPATIENT
Start: 2024-02-08

## 2024-02-08 NOTE — TELEPHONE ENCOUNTER
PCP: Solomon Stevenson MD    Last appt: [unfilled]  No future appointments.    Requested Prescriptions     Pending Prescriptions Disp Refills    atorvastatin (LIPITOR) 10 MG tablet [Pharmacy Med Name: ATORVASTATIN 10 MG TABLET] 90 tablet 1     Sig: TAKE 1 TABLET BY MOUTH EVERY DAY       Prior labs and Blood pressures:  BP Readings from Last 3 Encounters:   09/26/23 (!) 155/98   08/02/23 (!) 144/95   10/24/22 130/79     Lab Results   Component Value Date/Time     09/26/2023 02:43 PM    K 3.9 09/26/2023 02:43 PM     09/26/2023 02:43 PM    CO2 25 09/26/2023 02:43 PM    BUN 15 09/26/2023 02:43 PM    GFRAA >60 04/13/2022 11:12 AM     No results found for: \"HBA1C\", \"[id number]\"  Lab Results   Component Value Date/Time    CHOL 166 09/26/2023 02:43 PM    HDL 66 09/26/2023 02:43 PM     No results found for: \"VITD3\", \"VD3RIA\"    Lab Results   Component Value Date/Time    TSH 1.120 08/16/2019 10:34 AM

## 2024-04-01 DIAGNOSIS — J30.9 ALLERGIC RHINITIS, UNSPECIFIED SEASONALITY, UNSPECIFIED TRIGGER: ICD-10-CM

## 2024-04-01 RX ORDER — FEXOFENADINE HCL 180 MG/1
180 TABLET ORAL DAILY
Qty: 90 TABLET | Refills: 1 | OUTPATIENT
Start: 2024-04-01

## 2024-04-01 RX ORDER — FEXOFENADINE HCL 180 MG/1
180 TABLET ORAL DAILY
Qty: 90 TABLET | Refills: 1 | Status: SHIPPED | OUTPATIENT
Start: 2024-04-01

## 2024-04-01 NOTE — TELEPHONE ENCOUNTER
PCP: Solomon Stevenson MD    Last appt: [unfilled]  No future appointments.    Requested Prescriptions     Pending Prescriptions Disp Refills    fexofenadine (ALLEGRA) 180 MG tablet [Pharmacy Med Name: FEXOFENADINE  MG TABLET] 90 tablet 1     Sig: TAKE 1 TABLET BY MOUTH EVERY DAY       Prior labs and Blood pressures:  BP Readings from Last 3 Encounters:   09/26/23 (!) 155/98   08/02/23 (!) 144/95   10/24/22 130/79     Lab Results   Component Value Date/Time     09/26/2023 02:43 PM    K 3.9 09/26/2023 02:43 PM     09/26/2023 02:43 PM    CO2 25 09/26/2023 02:43 PM    BUN 15 09/26/2023 02:43 PM    GFRAA >60 04/13/2022 11:12 AM     No results found for: \"HBA1C\", \"[id number]\"  Lab Results   Component Value Date/Time    CHOL 166 09/26/2023 02:43 PM    HDL 66 09/26/2023 02:43 PM     No results found for: \"VITD3\", \"VD3RIA\"    Lab Results   Component Value Date/Time    TSH 1.120 08/16/2019 10:34 AM

## 2024-07-08 RX ORDER — METOPROLOL TARTRATE 50 MG/1
100 TABLET, FILM COATED ORAL DAILY
Qty: 180 TABLET | Refills: 1 | Status: SHIPPED | OUTPATIENT
Start: 2024-07-08

## 2024-07-24 ENCOUNTER — OFFICE VISIT (OUTPATIENT)
Facility: CLINIC | Age: 64
End: 2024-07-24
Payer: COMMERCIAL

## 2024-07-24 VITALS
SYSTOLIC BLOOD PRESSURE: 121 MMHG | TEMPERATURE: 97.9 F | BODY MASS INDEX: 29.21 KG/M2 | HEART RATE: 65 BPM | WEIGHT: 227.6 LBS | OXYGEN SATURATION: 98 % | RESPIRATION RATE: 16 BRPM | DIASTOLIC BLOOD PRESSURE: 77 MMHG | HEIGHT: 74 IN

## 2024-07-24 DIAGNOSIS — E78.00 PURE HYPERCHOLESTEROLEMIA, UNSPECIFIED: ICD-10-CM

## 2024-07-24 DIAGNOSIS — I10 ESSENTIAL (PRIMARY) HYPERTENSION: ICD-10-CM

## 2024-07-24 DIAGNOSIS — Z23 ENCOUNTER FOR IMMUNIZATION: ICD-10-CM

## 2024-07-24 DIAGNOSIS — K40.20 NON-RECURRENT BILATERAL INGUINAL HERNIA WITHOUT OBSTRUCTION OR GANGRENE: ICD-10-CM

## 2024-07-24 DIAGNOSIS — Z00.00 ENCOUNTER FOR WELL ADULT EXAM WITHOUT ABNORMAL FINDINGS: Primary | ICD-10-CM

## 2024-07-24 DIAGNOSIS — Z12.5 SCREENING FOR MALIGNANT NEOPLASM OF PROSTATE: ICD-10-CM

## 2024-07-24 PROCEDURE — 99396 PREV VISIT EST AGE 40-64: CPT | Performed by: FAMILY MEDICINE

## 2024-07-24 PROCEDURE — 99214 OFFICE O/P EST MOD 30 MIN: CPT | Performed by: FAMILY MEDICINE

## 2024-07-24 PROCEDURE — 3074F SYST BP LT 130 MM HG: CPT | Performed by: FAMILY MEDICINE

## 2024-07-24 PROCEDURE — 3078F DIAST BP <80 MM HG: CPT | Performed by: FAMILY MEDICINE

## 2024-07-24 ASSESSMENT — ENCOUNTER SYMPTOMS
SHORTNESS OF BREATH: 0
BLOOD IN STOOL: 0

## 2024-07-24 NOTE — PROGRESS NOTES
Identified patient with two patient identifiers (name and ). Reviewed chart in preparation for visit and have obtained necessary documentation.    Moreno Juan III is a 63 y.o. male  Chief Complaint   Patient presents with    Annual Exam     Resp 16   Ht 1.88 m (6' 2\")   Wt 103.2 kg (227 lb 9.6 oz)   BMI 29.22 kg/m²     1. Have you been to the ER, urgent care clinic since your last visit?  Hospitalized since your last visit?no    2. Have you seen or consulted any other health care providers outside of the [facility] since your last visit?  Include any pap smears or colon screening. no

## 2024-07-24 NOTE — PROGRESS NOTES
Well Adult Note  Name: Moreno Juan III Today’s Date: 2024   MRN: [id number] Sex: Male   Age: 63 y.o. Ethnicity: Non- / Non    : 1960 Race: White (non-)      Moreno Juan III is here for a well adult exam.       Subjective   History:  BP  At goal  Exercises regularly  Weight training and cardio/elliptical and walking.  Takes meds consistenlty    Cholesterol  On statin.    Non smoker  No drugs  Wine every night.    Review of Systems   Respiratory:  Negative for shortness of breath.    Cardiovascular:  Negative for chest pain.   Gastrointestinal:  Negative for blood in stool.   Genitourinary:  Negative for hematuria.   Psychiatric/Behavioral: Negative.         No Known Allergies  Prior to Visit Medications    Medication Sig Taking? Authorizing Provider   Tetanus-Diphth-Acell Pertussis (BOOSTRIX) 5-2.5-18.5 LF-MCG/0.5 injection Inject 0.5 mLs into the muscle once for 1 dose Yes Solomon Stevenson MD   metoprolol tartrate (LOPRESSOR) 50 MG tablet TAKE 2 TABLETS BY MOUTH EVERY DAY Yes Solomon Stevenson MD   fexofenadine (ALLEGRA) 180 MG tablet Take 1 tablet by mouth daily Yes Solomon Stevenson MD   atorvastatin (LIPITOR) 10 MG tablet TAKE 1 TABLET BY MOUTH EVERY DAY Yes Solomon Stevenson MD   ramipril (ALTACE) 5 MG capsule TAKE 1 CAPSULE BY MOUTH EVERY DAY Yes Solomon Stevenson MD   aspirin 81 MG chewable tablet Take 1 tablet by mouth daily Yes Automatic Reconciliation, Ar   scopolamine (TRANSDERM-SCOP) transdermal patch Place 1 patch onto the skin every 72 hours Yes Automatic Reconciliation, Ar     Past Medical History:   Diagnosis Date    DDD (degenerative disc disease), cervical     -C5-C6    Foot fracture 2022    third metatarsal left.  Boot.    Hyperlipidemia     Hypertension     Inguinal hernia     BIlateral unrepaired    Inguinal ring laxity     Rt  Inguinal Canal     Obesity     Osteoarthritis     Primarily affecting the knees (mild)     Past Surgical

## 2024-07-24 NOTE — PATIENT INSTRUCTIONS
dams, and limiting sex partners can help prevent STIs.     Use birth control if it's important to you to prevent pregnancy. Talk with your doctor about your choices and what might be best for you.   Prevent problems where you can. Protect your skin from too much sun, wash your hands, brush your teeth twice a day, and wear a seat belt in the car.   Where can you learn more?  Go to [email or web address]/patientEd and enter P072 to learn more about \"Well Visit, Ages 18 to 65: Care Instructions.\"  Current as of: August 6, 2023  Content Version: 14.1  © 8940-5193 [employer].   Care instructions adapted under license by [employer]. If you have questions about a medical condition or this instruction, always ask your healthcare professional. [employer] disclaims any warranty or liability for your use of this information.

## 2024-07-25 LAB
ALBUMIN SERPL-MCNC: 4.2 G/DL (ref 3.5–5)
ALBUMIN/GLOB SERPL: 1.4 (ref 1.1–2.2)
ALP SERPL-CCNC: 104 U/L (ref 45–117)
ALT SERPL-CCNC: 32 U/L (ref 12–78)
ANION GAP SERPL CALC-SCNC: 6 MMOL/L (ref 5–15)
AST SERPL-CCNC: 30 U/L (ref 15–37)
BASOPHILS # BLD: 0.1 K/UL (ref 0–0.1)
BASOPHILS NFR BLD: 1 % (ref 0–1)
BILIRUB DIRECT SERPL-MCNC: 0.2 MG/DL (ref 0–0.2)
BILIRUB SERPL-MCNC: 0.6 MG/DL (ref 0.2–1)
BUN SERPL-MCNC: 23 MG/DL (ref 6–20)
BUN/CREAT SERPL: 21 (ref 12–20)
CALCIUM SERPL-MCNC: 9.5 MG/DL (ref 8.5–10.1)
CHLORIDE SERPL-SCNC: 106 MMOL/L (ref 97–108)
CHOLEST SERPL-MCNC: 157 MG/DL
CO2 SERPL-SCNC: 27 MMOL/L (ref 21–32)
CREAT SERPL-MCNC: 1.1 MG/DL (ref 0.7–1.3)
CREAT UR-MCNC: 164 MG/DL
DIFFERENTIAL METHOD BLD: ABNORMAL
EOSINOPHIL # BLD: 0.3 K/UL (ref 0–0.4)
EOSINOPHIL NFR BLD: 4 % (ref 0–7)
ERYTHROCYTE [DISTWIDTH] IN BLOOD BY AUTOMATED COUNT: 12.9 % (ref 11.5–14.5)
EST. AVERAGE GLUCOSE BLD GHB EST-MCNC: 100 MG/DL
GLOBULIN SER CALC-MCNC: 2.9 G/DL (ref 2–4)
GLUCOSE SERPL-MCNC: 115 MG/DL (ref 65–100)
HBA1C MFR BLD: 5.1 % (ref 4–5.6)
HCT VFR BLD AUTO: 44 % (ref 36.6–50.3)
HDLC SERPL-MCNC: 67 MG/DL
HDLC SERPL: 2.3 (ref 0–5)
HGB BLD-MCNC: 14.4 G/DL (ref 12.1–17)
IMM GRANULOCYTES # BLD AUTO: 0.1 K/UL (ref 0–0.04)
IMM GRANULOCYTES NFR BLD AUTO: 1 % (ref 0–0.5)
LDLC SERPL CALC-MCNC: 75.2 MG/DL (ref 0–100)
LYMPHOCYTES # BLD: 1.8 K/UL (ref 0.8–3.5)
LYMPHOCYTES NFR BLD: 26 % (ref 12–49)
MCH RBC QN AUTO: 32.1 PG (ref 26–34)
MCHC RBC AUTO-ENTMCNC: 32.7 G/DL (ref 30–36.5)
MCV RBC AUTO: 98.2 FL (ref 80–99)
MICROALBUMIN UR-MCNC: 0.68 MG/DL
MICROALBUMIN/CREAT UR-RTO: 4 MG/G (ref 0–30)
MONOCYTES # BLD: 0.8 K/UL (ref 0–1)
MONOCYTES NFR BLD: 11 % (ref 5–13)
NEUTS SEG # BLD: 4 K/UL (ref 1.8–8)
NEUTS SEG NFR BLD: 57 % (ref 32–75)
NRBC # BLD: 0 K/UL (ref 0–0.01)
NRBC BLD-RTO: 0 PER 100 WBC
PLATELET # BLD AUTO: 215 K/UL (ref 150–400)
PMV BLD AUTO: 9.3 FL (ref 8.9–12.9)
POTASSIUM SERPL-SCNC: 4.9 MMOL/L (ref 3.5–5.1)
PROT SERPL-MCNC: 7.1 G/DL (ref 6.4–8.2)
PSA SERPL-MCNC: 0.6 NG/ML (ref 0.01–4)
RBC # BLD AUTO: 4.48 M/UL (ref 4.1–5.7)
SODIUM SERPL-SCNC: 139 MMOL/L (ref 136–145)
TRIGL SERPL-MCNC: 74 MG/DL
VLDLC SERPL CALC-MCNC: 14.8 MG/DL
WBC # BLD AUTO: 6.9 K/UL (ref 4.1–11.1)

## 2024-08-12 DIAGNOSIS — I10 ESSENTIAL (PRIMARY) HYPERTENSION: ICD-10-CM

## 2024-08-12 RX ORDER — ATORVASTATIN CALCIUM 10 MG/1
10 TABLET, FILM COATED ORAL DAILY
Qty: 90 TABLET | Refills: 1 | Status: SHIPPED | OUTPATIENT
Start: 2024-08-12

## 2024-08-12 RX ORDER — RAMIPRIL 5 MG/1
CAPSULE ORAL DAILY
Qty: 90 CAPSULE | Refills: 1 | Status: SHIPPED | OUTPATIENT
Start: 2024-08-12

## 2024-10-03 DIAGNOSIS — J30.9 ALLERGIC RHINITIS, UNSPECIFIED SEASONALITY, UNSPECIFIED TRIGGER: ICD-10-CM

## 2024-10-03 RX ORDER — FEXOFENADINE HCL 180 MG/1
180 TABLET ORAL DAILY
Qty: 90 TABLET | Refills: 0 | Status: SHIPPED | OUTPATIENT
Start: 2024-10-03

## 2024-11-07 RX ORDER — METOPROLOL TARTRATE 50 MG
100 TABLET ORAL DAILY
Qty: 180 TABLET | Refills: 1 | Status: SHIPPED | OUTPATIENT
Start: 2024-11-07

## 2024-11-13 DIAGNOSIS — I10 ESSENTIAL (PRIMARY) HYPERTENSION: ICD-10-CM

## 2024-11-13 RX ORDER — RAMIPRIL 5 MG/1
5 CAPSULE ORAL DAILY
Qty: 90 CAPSULE | Refills: 1 | Status: SHIPPED | OUTPATIENT
Start: 2024-11-13

## 2024-11-13 RX ORDER — ATORVASTATIN CALCIUM 10 MG/1
10 TABLET, FILM COATED ORAL DAILY
Qty: 90 TABLET | Refills: 1 | Status: SHIPPED | OUTPATIENT
Start: 2024-11-13

## 2024-11-14 RX ORDER — METOPROLOL TARTRATE 50 MG
100 TABLET ORAL DAILY
Qty: 180 TABLET | Refills: 1 | Status: SHIPPED | OUTPATIENT
Start: 2024-11-14

## 2025-01-01 DIAGNOSIS — J30.9 ALLERGIC RHINITIS, UNSPECIFIED SEASONALITY, UNSPECIFIED TRIGGER: ICD-10-CM

## 2025-01-03 RX ORDER — FEXOFENADINE HCL 180 MG/1
180 TABLET ORAL DAILY
Qty: 90 TABLET | Refills: 0 | Status: SHIPPED | OUTPATIENT
Start: 2025-01-03

## 2025-02-13 DIAGNOSIS — I10 ESSENTIAL (PRIMARY) HYPERTENSION: ICD-10-CM

## 2025-02-13 RX ORDER — METOPROLOL TARTRATE 50 MG
100 TABLET ORAL DAILY
Qty: 180 TABLET | Refills: 1 | Status: SHIPPED | OUTPATIENT
Start: 2025-02-13

## 2025-02-13 RX ORDER — RAMIPRIL 5 MG/1
5 CAPSULE ORAL DAILY
Qty: 90 CAPSULE | Refills: 1 | Status: SHIPPED | OUTPATIENT
Start: 2025-02-13

## 2025-02-13 RX ORDER — ATORVASTATIN CALCIUM 10 MG/1
10 TABLET, FILM COATED ORAL DAILY
Qty: 90 TABLET | Refills: 1 | Status: SHIPPED | OUTPATIENT
Start: 2025-02-13

## 2025-03-29 DIAGNOSIS — J30.9 ALLERGIC RHINITIS, UNSPECIFIED SEASONALITY, UNSPECIFIED TRIGGER: ICD-10-CM

## 2025-04-01 RX ORDER — FEXOFENADINE HCL 180 MG/1
180 TABLET ORAL DAILY
Qty: 90 TABLET | Refills: 1 | Status: SHIPPED | OUTPATIENT
Start: 2025-04-01

## 2025-08-09 DIAGNOSIS — I10 ESSENTIAL (PRIMARY) HYPERTENSION: ICD-10-CM

## 2025-08-11 RX ORDER — ATORVASTATIN CALCIUM 10 MG/1
10 TABLET, FILM COATED ORAL DAILY
Qty: 90 TABLET | Refills: 1 | Status: SHIPPED | OUTPATIENT
Start: 2025-08-11

## 2025-08-11 RX ORDER — RAMIPRIL 5 MG/1
CAPSULE ORAL DAILY
Qty: 90 CAPSULE | Refills: 1 | Status: SHIPPED | OUTPATIENT
Start: 2025-08-11

## 2025-08-11 RX ORDER — METOPROLOL TARTRATE 50 MG
100 TABLET ORAL DAILY
Qty: 180 TABLET | Refills: 1 | Status: SHIPPED | OUTPATIENT
Start: 2025-08-11